# Patient Record
Sex: FEMALE | Race: AMERICAN INDIAN OR ALASKA NATIVE
[De-identification: names, ages, dates, MRNs, and addresses within clinical notes are randomized per-mention and may not be internally consistent; named-entity substitution may affect disease eponyms.]

---

## 2018-01-02 ENCOUNTER — HOSPITAL ENCOUNTER (OUTPATIENT)
Dept: HOSPITAL 31 - C.ENDO | Age: 53
Discharge: HOME | End: 2018-01-02
Attending: SPECIALIST
Payer: COMMERCIAL

## 2018-01-02 VITALS — HEART RATE: 55 BPM | DIASTOLIC BLOOD PRESSURE: 66 MMHG | SYSTOLIC BLOOD PRESSURE: 122 MMHG

## 2018-01-02 VITALS — OXYGEN SATURATION: 100 %

## 2018-01-02 VITALS — TEMPERATURE: 97.7 F

## 2018-01-02 VITALS — RESPIRATION RATE: 12 BRPM

## 2018-01-02 DIAGNOSIS — Z86.010: ICD-10-CM

## 2018-01-02 DIAGNOSIS — K52.9: ICD-10-CM

## 2018-01-02 DIAGNOSIS — K64.8: ICD-10-CM

## 2018-01-02 DIAGNOSIS — I25.10: ICD-10-CM

## 2018-01-02 DIAGNOSIS — Z88.0: ICD-10-CM

## 2018-01-02 DIAGNOSIS — Z86.73: ICD-10-CM

## 2018-01-02 DIAGNOSIS — K57.30: Primary | ICD-10-CM

## 2018-01-02 DIAGNOSIS — I10: ICD-10-CM

## 2018-01-02 PROCEDURE — 88305 TISSUE EXAM BY PATHOLOGIST: CPT

## 2018-01-02 PROCEDURE — 45380 COLONOSCOPY AND BIOPSY: CPT

## 2018-01-02 NOTE — CP.SDSHP
Same Day Surgery [facility] & P





- History


Proposed Procedure: COLONSCOPY


Pre-Op Diagnosis: SEE NOTES





- Previous Medical/Surgical History


Cardiac: Hypertension, ASHD/CAD


Endocrine/Metabolic: Other


Neuro: TIA/CVA, Backaches


Previous Surgical History: COLON POLYP REMOVAL





- Allergies


Allergies: 


Allergies





Penicillins Allergy (Intermediate, Verified 01/02/18 07:01)


 RASH











- Physical Exam


Vital Signs: 


 Vital Signs











  01/02/18





  07:32


 


Temperature 97 F L


 


Pulse Rate 61


 


Respiratory 19





Rate 


 


Blood Pressure 128/83


 


O2 Sat by Pulse 100





Oximetry 











Mental Status: Alert & Oriented x3


Neuro: WNL


Heart: Other


Lungs: WNL


GI: Other





- {Optional Preform as Required}


Breast: WNL


Abdomen: Other


Rectal: Other


Integument: WNL


: WNL


Ortho: Other


ENT: WNL





- Impression


Pt. Evaluated Today:Candidate for Anesthesia & Procedure: Yes





- Date & Time


Time: 08:27





Short Stay Discharge





- Short Stay Discharge


Admitting Diagnosis/Reason for Visit: P/H/ COLON POLYPS


Disposition: HOME/ ROUTINE

## 2018-08-08 ENCOUNTER — HOSPITAL ENCOUNTER (INPATIENT)
Dept: HOSPITAL 42 - ED | Age: 53
LOS: 5 days | Discharge: HOME | DRG: 880 | End: 2018-08-13
Attending: INTERNAL MEDICINE | Admitting: INTERNAL MEDICINE
Payer: COMMERCIAL

## 2018-08-08 VITALS — BODY MASS INDEX: 41.8 KG/M2

## 2018-08-08 DIAGNOSIS — I69.351: ICD-10-CM

## 2018-08-08 DIAGNOSIS — G47.33: ICD-10-CM

## 2018-08-08 DIAGNOSIS — Z79.82: ICD-10-CM

## 2018-08-08 DIAGNOSIS — M21.371: ICD-10-CM

## 2018-08-08 DIAGNOSIS — R47.1: ICD-10-CM

## 2018-08-08 DIAGNOSIS — E78.5: ICD-10-CM

## 2018-08-08 DIAGNOSIS — G40.909: ICD-10-CM

## 2018-08-08 DIAGNOSIS — I63.9: Primary | ICD-10-CM

## 2018-08-08 DIAGNOSIS — I10: ICD-10-CM

## 2018-08-08 DIAGNOSIS — R29.810: ICD-10-CM

## 2018-08-08 DIAGNOSIS — E11.69: ICD-10-CM

## 2018-08-08 DIAGNOSIS — R40.2412: ICD-10-CM

## 2018-08-08 DIAGNOSIS — E66.01: ICD-10-CM

## 2018-08-08 DIAGNOSIS — J44.9: ICD-10-CM

## 2018-08-08 DIAGNOSIS — E87.6: ICD-10-CM

## 2018-08-08 LAB
ALBUMIN SERPL-MCNC: 4.6 G/DL (ref 3–4.8)
ALBUMIN/GLOB SERPL: 1.3 {RATIO} (ref 1.1–1.8)
ALT SERPL-CCNC: 31 U/L (ref 7–56)
APTT BLD: 32.5 SECONDS (ref 25.1–36.5)
AST SERPL-CCNC: 29 U/L (ref 14–36)
BASOPHILS # BLD AUTO: 0.04 K/MM3 (ref 0–2)
BASOPHILS NFR BLD: 0.5 % (ref 0–3)
BUN SERPL-MCNC: 13 MG/DL (ref 7–21)
CALCIUM SERPL-MCNC: 9.3 MG/DL (ref 8.4–10.5)
EOSINOPHIL # BLD: 0.2 10*3/UL (ref 0–0.7)
EOSINOPHIL NFR BLD: 2.1 % (ref 1.5–5)
ERYTHROCYTE [DISTWIDTH] IN BLOOD BY AUTOMATED COUNT: 15.6 % (ref 11.5–14.5)
GFR NON-AFRICAN AMERICAN: > 60
GRANULOCYTES # BLD: 4.12 10*3/UL (ref 1.4–6.5)
GRANULOCYTES NFR BLD: 53.7 % (ref 50–68)
HDLC SERPL-MCNC: 55 MG/DL (ref 29–60)
HGB BLD-MCNC: 12.7 G/DL (ref 12–16)
INR PPP: 1
LDLC SERPL-MCNC: 51 MG/DL (ref 0–129)
LYMPHOCYTES # BLD: 2.7 10*3/UL (ref 1.2–3.4)
LYMPHOCYTES NFR BLD AUTO: 35.4 % (ref 22–35)
MCH RBC QN AUTO: 25 PG (ref 25–35)
MCHC RBC AUTO-ENTMCNC: 32.3 G/DL (ref 31–37)
MCV RBC AUTO: 77.2 FL (ref 80–105)
MONOCYTES # BLD AUTO: 0.6 10*3/UL (ref 0.1–0.6)
MONOCYTES NFR BLD: 8.3 % (ref 1–6)
PLATELET # BLD: 367 10^3/UL (ref 120–450)
PMV BLD AUTO: 9.9 FL (ref 7–11)
PROTHROMBIN TIME: 11.4 SECONDS (ref 9.4–12.5)
RBC # BLD AUTO: 5.09 10^6/UL (ref 3.5–6.1)
TROPONIN I SERPL-MCNC: 0.01 NG/ML
WBC # BLD AUTO: 7.7 10^3/UL (ref 4.5–11)

## 2018-08-08 RX ADMIN — LEVETIRACETAM SCH MLS/HR: 5 INJECTION INTRAVENOUS at 21:20

## 2018-08-08 NOTE — CP.PCM.HP
<Portillo An - Last Filed: 08/08/18 21:27>





History of Present Illness





- History of Present Illness


History of Present Illness: 


CC: Right Sided Weakness/?CVA





HPI: Ms. Balderas is a 52 year old female with a past medical history 

significant for previous CVA with residual RLE weakness, seizures, DM2, HTN, TRISTIN

, asthma, and vertigo who presented with right sided weakness, dysarthria and 

facial drooping that started at approximately 1430. Patient reports that she 

was in Dr. Reed's office (Neurology) having an EEG when her entire body began 

"shaking". This lasted for a "few minutes" and afterwards patient was noted to 

have facial drooping on the right side and right lower extremity weakness 

worsened from her baseline. Patient denies any LOC or loss of bowl or bladder 

control during the event but endorses tongue biting. She denies any other 

symptoms including fever, chills, headache, changes in her vision, dysphagia, 

neck pain/stiffness, chest pain, palpitations, SOB, cough, wheezing, abdominal 

pain, N/V/D/C, melena, changes in urine output, skin changes, or any numbness/

tingling of any extremity.





PMH: As stated above


PSH: As stated above


Family History: Non-Contributory


Social History: Denies any tobacco, alcohol or illicit drug use


Allergies: Penicillin


Home Medications: As per MAR





Present on Admission





- Present on Admission


Any Indicators Present on Admission: No





Review of Systems





- Review of Systems


Review of Systems: 


As stated in the HPI, otherwise negative





Past Patient History





- Infectious Disease


Hx of Infectious Diseases: None





- Past Social History


Smoking Status: Unknown If Ever Smoked





- NEUROLOGICAL


HX Cerebrovascular Accident: Yes


Hx Seizures: Yes





- PSYCHIATRIC


Hx Substance Use: No





- SURGICAL HISTORY


Hx Surgeries: No





Meds


Allergies/Adverse Reactions: 


 Allergies











Allergy/AdvReac Type Severity Reaction Status Date / Time


 


Penicillins Allergy Intermediate RASH Verified 01/02/18 07:01














Physical Exam





- Constitutional


Appears: Non-toxic, No Acute Distress





- Head Exam


Head Exam: ATRAUMATIC, NORMOCEPHALIC


Additional comments: 


Right sided facial droop





- Eye Exam


Eye Exam: EOMI, Normal appearance, PERRL.  absent: Conjunctival injection, 

Nystagmus, Periorbital swelling, Periorbital tenderness, Scleral icterus


Pupil Exam: NORMAL ACCOMODATION, PERRL.  absent: Fixed, Irregular, Miosis, 

Mydriatic, Unequal





- ENT Exam


ENT Exam: Mucous Membranes Moist, Normal External Ear Exam (Bilateral 

laceration on tongue consistent with ).  absent: Mucous Membranes Dry, Normal 

Oropharynx





- Neck Exam


Neck exam: Positive for: Full Rom, Normal Inspection.  Negative for: 

Lymphadenopathy, Meningismus, Tenderness, Thyromegaly





- Respiratory Exam


Respiratory Exam: Clear to Auscultation Bilateral, NORMAL BREATHING PATTERN.  

absent: Accessory Muscle Use, Chest Wall Tenderness, Decreased Breath Sounds, 

Prolonged Expiratory Phase, Rales, Rhonchi, Wheezes, Respiratory Distress, 

Stridor





- Cardiovascular Exam


Cardiovascular Exam: REGULAR RHYTHM, RRR, +S1, +S2.  absent: Bradycardia, 

Tachycardia, Clicks, Diastolic murmur, Gallop, Irregular Rhythm, JVD, Rubs, +S4

, Systolic Murmur





- GI/Abdominal Exam


GI & Abdominal Exam: Normal Bowel Sounds, Soft.  absent: Bruit, Diminished 

Bowel Sounds, Distended, Firm, Guarding, Hernia, Hyperactive Bowel Sounds, 

Hypoactive Bowel Sounds, Mass, Organomegaly, Pulsatile Mass, Rebound, Rigid, 

Tenderness





- Extremities Exam


Extremities exam: Positive for: normal capillary refill, pedal pulses present.  

Negative for: calf tenderness, full ROM (Decreased ROM in RLE), joint swelling, 

normal inspection, pedal edema, tenderness





- Back Exam


Back exam: FULL ROM, NORMAL INSPECTION.  absent: CVA tenderness (L), CVA 

tenderness (R), muscle spasm, paraspinal tenderness, rash noted, tenderness, 

vertebral tenderness





- Neurological Exam


Neurological exam: Alert, Oriented x3





- Expanded Neurological Exam


  ** Expanded


Patient oriented to: person, place, time


Speech: Slurred Speech


Cranial nerves: Facial Palsey w/Forehead Movement: Abnormal Right


Neuro motor strength exam: Left Upper Extremity: 4, Right Upper Extremity: 4, 

Left Lower Extremity: 4, Right Lower Extremity: 2/1





- Psychiatric Exam


Psychiatric exam: Normal Affect, Normal Mood





- Skin


Skin Exam: Dry, Intact, Normal Color, Warm





Results





- Vital Signs


Recent Vital Signs: 





 Last Vital Signs











Temp  99 F   08/08/18 17:15


 


Pulse  77   08/08/18 17:15


 


Resp  20   08/08/18 17:15


 


BP  132/89   08/08/18 17:15


 


Pulse Ox      














- Labs


Result Diagrams: 


 08/08/18 14:30





 08/08/18 14:30


Labs: 





 Laboratory Results - last 24 hr











  08/08/18 08/08/18 08/08/18





  14:30 14:30 14:30


 


WBC  7.7  


 


RBC  5.09  


 


Hgb  12.7  


 


Hct  39.3  


 


MCV  77.2 L  


 


MCH  25.0  


 


MCHC  32.3  


 


RDW  15.6 H  


 


Plt Count  367  


 


MPV  9.9  


 


Gran %  53.7  


 


Lymph % (Auto)  35.4 H  


 


Mono % (Auto)  8.3 H  


 


Eos % (Auto)  2.1  


 


Baso % (Auto)  0.5  


 


Gran #  4.12  


 


Lymph # (Auto)  2.7  


 


Mono # (Auto)  0.6  


 


Eos # (Auto)  0.2  


 


Baso # (Auto)  0.04  


 


PT   11.4 


 


INR   1.00 


 


APTT   32.5 


 


Sodium    145


 


Potassium    4.2


 


Chloride    106


 


Carbon Dioxide    26


 


Anion Gap    18


 


BUN    13


 


Creatinine    0.9


 


Est GFR ( Amer)    > 60


 


Est GFR (Non-Af Amer)    > 60


 


Random Glucose    84


 


Calcium    9.3


 


Total Bilirubin    0.4


 


AST    29


 


ALT    31


 


Alkaline Phosphatase    99


 


Troponin I    0.01


 


Total Protein    8.0


 


Albumin    4.6


 


Globulin    3.5


 


Albumin/Globulin Ratio    1.3


 


Triglycerides    102


 


Cholesterol    130


 


LDL Cholesterol Direct    51


 


HDL Cholesterol    55


 


Blood Type   


 


Antibody Screen   


 


BBK History Checked   














  08/08/18





  14:30


 


WBC 


 


RBC 


 


Hgb 


 


Hct 


 


MCV 


 


MCH 


 


MCHC 


 


RDW 


 


Plt Count 


 


MPV 


 


Gran % 


 


Lymph % (Auto) 


 


Mono % (Auto) 


 


Eos % (Auto) 


 


Baso % (Auto) 


 


Gran # 


 


Lymph # (Auto) 


 


Mono # (Auto) 


 


Eos # (Auto) 


 


Baso # (Auto) 


 


PT 


 


INR 


 


APTT 


 


Sodium 


 


Potassium 


 


Chloride 


 


Carbon Dioxide 


 


Anion Gap 


 


BUN 


 


Creatinine 


 


Est GFR ( Amer) 


 


Est GFR (Non-Af Amer) 


 


Random Glucose 


 


Calcium 


 


Total Bilirubin 


 


AST 


 


ALT 


 


Alkaline Phosphatase 


 


Troponin I 


 


Total Protein 


 


Albumin 


 


Globulin 


 


Albumin/Globulin Ratio 


 


Triglycerides 


 


Cholesterol 


 


LDL Cholesterol Direct 


 


HDL Cholesterol 


 


Blood Type  O POSITIVE


 


Antibody Screen  Negative


 


BBK History Checked  No verified bt














Assessment & Plan





- Assessment and Plan (Free Text)


Assessment: 


52 year old female with a past medical history significant for previous CVA 

with residual RLE weakness, seizures, DM2, HTN, TRISTIN, asthma, and vertigo who 

presented with right sided weakness, dysarthria and facial drooping that 

started at approximately 1430.


Plan: 


1. Dysarthria/?CVA


-CT Head showed no intracranial abnormalities


-Initial NIHSS score in ED: 3


-Given tPA in ED at approximately 1600


-CTA Head/Neck, Brain MRI and Echo pending


-Swallow Study pending


-NPO Diet


-Fall and Aspiration precautions


-ICU placement


-Neurology and Cardiology consulted, all recommendations appreciated





2. History of Seizures


-Keppra 500mg IV Q12


-Seizure precautions





3. History of DM2


-SSI-Low and Accuchecks Q6


-A1c pending





4. History of Asthma


-Duonebs Q2 PRN





GI Prophylaxis: Protonix


DVT Prophylaxis: SCD's 





Patient seen and case discussed with attending, Dr. Cummings.





[place] PGY2





- Date & Time


Date: 08/08/18


Time: 21:38





<Shannon Cummings - Last Filed: 08/09/18 08:44>





Results





- Vital Signs


Recent Vital Signs: 





 Last Vital Signs











Temp  98.4 F   08/09/18 07:47


 


Pulse  69   08/09/18 07:47


 


Resp  16   08/09/18 07:47


 


BP  112/67   08/09/18 07:47


 


Pulse Ox  94 L  08/09/18 06:45














- Labs


Result Diagrams: 


 08/08/18 14:30





 08/08/18 14:30


Labs: 





 Laboratory Results - last 24 hr











  08/08/18





  17:00


 


TSH 3rd Generation  5.38 H














Attending/Attestation





- Attestation


I have personally seen and examined this patient.: Yes


I have fully participated in the care of the patient.: Yes


I have reviewed all pertinent clinical information: Yes


Notes (Text): 





08/09/18 08:40





Medical record note made by the resident after discussion with my direction and 

input after the patient was personally seen and examined by me. I have reviewed 

the chart and agree that the record accurately reflects by personal performance 

of the history, physical exam, data review, and medical decision-making, in the 

course for the patient. I have also personally directed the plan of care.


52 year old female with a past medical history significant for previous CVA 

with residual RLE weakness, seizures, DM2, HTN, TRISTIN, asthma, and vertigo who 

presented with right sided weakness, dysarthria and facial drooping  , patient 

was in her Neurologist office at that time.CT head was negative.Patient is 

getting TPA in ER.We will monitor Neuro check, will kepp patient NPO, we will 

get MRI of Brain, CTA of head and neck and Echo.We will hold antiplatelet  for 

24 hour.


We will get speech and swallow evaluation.We will monitor blood pressure and 

blood sugars closely.





Management plan was discussed in detail with patient. Education was provided.

## 2018-08-08 NOTE — CP.PCM.CON
History of Present Illness





- History of Present Illness


History of Present Illness: 


     52 yr old woman with pmh of epilepsy, on keppra 500 mg hitesh is a patient 

of dr johnsonoors comes in [facility] with one hour onset of dysarthria and right sided 

weakness that started about one hour ago at 2:30 pm. Miss de dios was in dr toyin scott and was obtaining an eeg when she developedan acute onset of 

dysarthria and weakness of her rogh side. Upon arrival bto  the ER, symptoms 

continued.  








PMH


PSH


FH/SH


ALL:





Past Patient History





- Infectious Disease


Hx of Infectious Diseases: None





- Past Social History


Smoking Status: Unknown If Ever Smoked





- NEUROLOGICAL


HX Cerebrovascular Accident: Yes


Hx Seizures: Yes





- PSYCHIATRIC


Hx Substance Use: No





- SURGICAL HISTORY


Hx Surgeries: No





Meds


Allergies/Adverse Reactions: 


 Allergies











Allergy/AdvReac Type Severity Reaction Status Date / Time


 


No Known Allergies Allergy   Verified 08/08/18 15:28














- Medications


Medications: 


 Current Medications





Sodium Chloride (Sodium Chloride 0.9%)  1,000 mls @ 100 mls/hr IV .Q10H TYLER

## 2018-08-08 NOTE — EDPD
HPI Stroke





- General


Time Seen by Provider: 08/08/18 15:20


Historian: Patient, EMS





- History of Present Illness


Narrative History of Present Illness (Free Text): 


08/08/18 15:28


Patient is a 52 year old female whose past medical history includes CVA, who 

presents to the emergency department by EMS for possible seizure/stroke. EMS 

reports that the patient was last well at 14:30 today and was getting an EEG. 

At 15:00 she started visibly shaking, slurred speech, and right sided weakness. 

Patient reports that she subsequently couldn't walk. SHe normally walks with a 

cane.





Neurologist: Dr.Vinod Reed.





Date:: 08/08/18


Onset:: Just prior to presenting





Family/Social History





- Family/Social History


Family History: Non-Contributory





Allergies/Home Meds


Allergies/Adverse Reactions: 


Allergies





No Known Allergies Allergy (Verified 08/08/18 15:28)


 











Medical Decision Making


ED Course and Treatment: 








Impression:








Differential Diagnosis included but are not limited to:  





Plan:





-- Reassess and disposition





Prior Visits:


Notes and results from previous visits were reviewed.  Patient was last seen in 

the Emergency department on ***.





Progress Notes:


 





08/08/18 15:31








- RAD Interpretation


Radiology Orders: 








08/08/18 15:21


CTA HEAD/NECK CODE STROKE [CT] Stat 


HEAD W/O (CODE STROKE) [CT] Stat 


CHEST PORTABLE [RAD] Stat 














- Medication Orders


Current Medication Orders: 








Sodium Chloride (Sodium Chloride 0.9%)  1,000 mls @ 100 mls/hr IV .Q10H TYLER

## 2018-08-08 NOTE — ED PDOC
Arrival/HPI





- General


Chief Complaint: Weakness/Neurological Deficit


Time Seen by Provider: 08/08/18 15:20


Historian: Patient, EMS


EM Caveat: Acuity of Condition





- Critical Care


Critical Care Minutes: 30 minutes


Critical Care Time: Excluding Proc Time





- History of Present Illness


Narrative History of Present Illness (Text): 





08/08/18 15:36


Patient is a 52 year old female whose past medical history includes CVA, who 

presents to the Emergency department by EMS for possible seizure/stroke. As per 

EMS patient was last well at 14:30 and was getting an EEG. At 15:00 she started 

visibly shaking and developing slurred speech and right sided weakness. Patient 

reports that she subsequently couldn't walk. She normally walks with a cane. 

Futher hx limited by slurred speech.





Time/Duration: Prior to Arrival


Symptom Onset: Sudden





Past Medical History





- Provider Review


Nursing Documentation Reviewed: Yes





- Infectious Disease


Hx of Infectious Diseases: None





- Neurological


HX Cerebrovascular Accident: Yes


Hx Seizures: Yes





- Psychiatric


Hx Substance Use: No





Family/Social History





- Physician Review


Nursing Documentation Reviewed: Yes


Family/Social History: No Known Family HX


Smoking Status: Unknown If Ever Smoked


Hx Alcohol Use: No


Hx Substance Use: No





Allergies/Home Meds


Allergies/Adverse Reactions: 


Allergies





No Known Allergies Allergy (Verified 08/08/18 16:03)


 











Review of Systems





- Review of Systems


Systems not reviewed;Unavailable: Acuity of Condition


Neurological: Speech Changes, Other (right sided weakness)





Physical Exam


Vital Signs Reviewed: Yes


Vital Signs











  Temp Pulse Resp BP


 


 08/08/18 17:00  98.8 F  82  22  133/90


 


 08/08/18 16:46  99 F  79  22  154/75 H


 


 08/08/18 16:30  99.4 F  84  20  157/80 H











Temperature: Afebrile


Blood Pressure: Hypertensive


Pulse: Regular


Respiratory Rate: Normal


Appearance: Positive for: Well-Appearing


Mental Status: Positive for: Alert and Oriented X 3





- Systems Exam


Head: Present: Atraumatic, Normocephalic


Pupils: Present: PERRL


Extroacular Muscles: Present: EOMI


Conjunctiva: Present: Normal


Mouth: Present: Moist Mucous Membranes


Neck: Present: Normal Range of Motion


Respiratory/Chest: Present: Clear to Auscultation, Good Air Exchange.  No: 

Respiratory Distress, Accessory Muscle Use


Cardiovascular: Present: Regular Rate and Rhythm, Normal S1, S2.  No: Murmurs


Abdomen: No: Tenderness, Distention, Peritoneal Signs


Back: Present: Normal Inspection


Upper Extremity: Present: Normal Inspection, Normal ROM.  No: Cyanosis, Edema


Lower Extremity: Present: Other (Normal strength to left lower extremity. RLE: 

muscle twitch visible but cannot lift leg off stretcher.).  No: Edema, Normal 

ROM


Neurological: Present: GCS=15, Other (Left facial droop, slurred speech)


Skin: Present: Warm, Dry, Normal Color.  No: Rashes


Psychiatric: Present: Alert, Oriented x 3, Normal Insight, Normal Concentration





Medical Decision Making


ED Course and Treatment: 





08/08/18 15:27


Impression:


Patient is a 52 year old female who was brought by EMS for stroke evaluation.





Differential Diagnosis included but are not limited to:  





Plan:


--CTA and Head CT


--EKG


-- Labs


--cardiac enzymes


--blood work


--chest x-ray


--IV fluids


--Aspirin


-- Reassess and disposition





Progress Notes:


08/08/18 15:15 Patient immediately evalauted and code Stroke called.





08/08/18 15:18


Discussed case with , who is aware of patient and states that if it is a 

seizure then she isn't a TPA candidate. Requesting CT and CTA.





08/08/18 15:33


 called and reports that patient was hooked up on EEG when she started 

developing symptoms of slurred speech and leg weakness and EEG findings don't 

show seizure.  





08/08/18 16:16


Spoke to Dr. Wall and due to aphasia and weakness, decision made to give TPA.  

Weakness to R leg improving but due to aphasia and droop, decision still made 

to give TPA





08/08/18 16:28


Head CT without contrast:


Creator : Clark Sheikh MD


HISTORY:


Code Stroke


COMPARISON:


None available.


TECHNIQUE:


Axial computed tomography images were obtained through the head/brain without 

intravenous contrast.  


Radiation dose:


Total exam DLP = 870 mGy-cm.


This CT exam was performed using one or more of the following dose reduction 

techniques: Automated exposure control, adjustment of the mA and/or kV 

according to patient size, and/or use of iterative reconstruction technique.





FINDINGS:


HEMORRHAGE:


No intracranial hemorrhage. 


BRAIN:


No mass effect or edema.  No atrophy or chronic microvascular ischemic changes.


VENTRICLES:


Unremarkable. No hydrocephalus. 


CALVARIUM:


Unremarkable.


PARANASAL SINUSES:


Unremarkable as visualized. No significant inflammatory changes


MASTOID AIR CELLS:


Unremarkable as visualized. No inflammatory changes.


OTHER FINDINGS:


None.


IMPRESSION:


No acute findings





Chest X-ray:


Creator : Jagdeep Rojas MD


HISTORY:


Code Stroke


COMPARISON:


None available.


FINDINGS:


LUNGS:


Clear.


PLEURA:No pneumothorax or pleural fluid seen.


CARDIOVASCULAR:Normal.


OSSEOUS STRUCTURES: No significant abnormalities.


VISUALIZED UPPER ABDOMEN: Normal.


OTHER FINDINGS: None. 


IMPRESSION:


No active disease.





08/08/18 


CTA unable to be preformed due to IV access. Decision made with neurologist to 

administer TPA when IV access is obtained instead of sending her back for CTA.





08/08/18 16:51


EKG shows NSR at 78 bpm with LVH and nonspecific ST changes. Interpreted by me.





08/08/18 16:51


Intensivist at bedside.





08/08/18 16:53


Patient accepted by hospitalist.








- Lab Interpretations


Lab Results: 








 08/08/18 14:30 





 08/08/18 14:30 





 Lab Results





08/08/18 14:30: Sodium 145, Potassium 4.2, Chloride 106, Carbon Dioxide 26, 

Anion Gap 18, BUN 13, Creatinine 0.9, Est GFR (African Amer) > 60, Est GFR (Non-

Af Amer) > 60, Random Glucose 84, Calcium 9.3, Total Bilirubin 0.4, AST 29, ALT 

31, Alkaline Phosphatase 99, Troponin I 0.01, Total Protein 8.0, Albumin 4.6, 

Globulin 3.5, Albumin/Globulin Ratio 1.3, Triglycerides 102, Cholesterol 130, 

LDL Cholesterol Direct 51, HDL Cholesterol 55


08/08/18 14:30: PT 11.4, INR 1.00, APTT 32.5


08/08/18 14:30: WBC 7.7, RBC 5.09, Hgb 12.7, Hct 39.3, MCV 77.2 L, MCH 25.0, 

MCHC 32.3, RDW 15.6 H, Plt Count 367, MPV 9.9, Gran % 53.7, Lymph % (Auto) 35.4 

H, Mono % (Auto) 8.3 H, Eos % (Auto) 2.1, Baso % (Auto) 0.5, Gran # 4.12, Lymph 

# (Auto) 2.7, Mono # (Auto) 0.6, Eos # (Auto) 0.2, Baso # (Auto) 0.04








I have reviewed the lab results: Yes





- RAD Interpretation


Radiology Orders: 








08/08/18 15:21


CTA HEAD/NECK CODE STROKE [CT] Stat 


HEAD W/O (CODE STROKE) [CT] Stat 


CHEST ONE VIEW [RAD] Stat 





08/08/18 17:02


MRI CODE STROKE/CODE [place] [MRI] Stat 











: Radiologist





- EKG Interpretation


Interpreted by ED Physician: Yes


Type: 12 lead EKG





- Medication Orders


Current Medication Orders: 








Sodium Chloride (Sodium Chloride 0.9%)  1,000 mls @ 100 mls/hr IV .Q10H TYLER


Alteplase, Recombinant (Activase 100 Mg Inj)  81 mls @ 81 mls/hr IV ONCE ONE


   Stop: 08/08/18 17:29


Pantoprazole Sodium (Protonix Ec Tab)  40 mg PO 0600 TYLER





Discontinued Medications





Alteplase, Recombinant (Activase 100 Mg Inj)  9 mg IV ONCE ONE


   Stop: 08/08/18 16:16


Aspirin (Aspirin Supp)  300 mg RC STAT STA


   Stop: 08/08/18 15:42


   Last Admin: 08/08/18 15:53  Dose: 300 mg





MAR Pain/Vitals


 Document     08/08/18 15:53  LA  (Rec: 08/08/18 15:53  LYLE [id number])


     Pain Reassessment


      Is This A Pain ReAssessment?               No


     Sleep


      Is patient sleeping during reassessment?   No


     Presence of Pain


      Presence of Pain                           No














NIHSS Scale ([place])


Time Performed: 15:47





- How Severe is the Stoke


  ** Baseline


Level of Consciousness: 0=Alert


LOC to Questions: 0=Both comments correct


LOC to commands: 0=Obeys both correctly


Best Gaze: 0=Normal


Visual: 0=No visual loss


Facial: 1=Minor asymmetry


Motor Arm - Left: 0=No drift


Motor Arm - Right: 0=No drift


Motor Leg - Left: 0=No drift


Motor Leg - Right: 3=No effort against gravity (falls immediately)


Limb Ataxia: 0=Absent


Sensory: 0=Normal


Best Language: 1=Mild to moderate aphasia


Dysarthia: 1=Mild to moderate slurring


Extinction & Inattention (Neglect): 0=Normal, no object


Score: 6


Risk Level: Mod Stroke Risk





NIHSS Scale([place]) 2


Time Performed: 17:14





- How Severe is the Stoke


  ** Baseline


Level of Consciousness: 0=Alert


LOC to Questions: 0=Both comments correct


LOC to commands: 0=Obeys both correctly


Best Gaze: 0=Normal


Visual: 0=No visual loss


Facial: 1=Minor asymmetry


Motor Arm - Left: 0=No drift


Motor Arm - Right: 0=No drift


Motor Leg - Left: 0=No drift


Motor Leg - Right: 1=Drift before 5 sec


Limb Ataxia: 0=Absent


Sensory: 0=Normal


Best Language: 1=Mild to moderate aphasia


Dysarthia: 1=Mild to moderate slurring


Extinction & Inattention (Neglect): 0=Normal, no object


Score: 4


Risk Level: Minor Stroke Risk





- Scribe Statement


The provider has reviewed the documentation as recorded by the Scribroseanne Trinh


Provider Scribe Attestation:


All medical record entries made by the Scribe were at my direction and 

personally dictated by me. I have reviewed the chart and agree that the record 

accurately reflects my personal performance of the history, physical exam, 

medical decision making, and the department course for this patient. I have 

also personally directed, reviewed, and agree with the discharge instructions 

and disposition. 





Disposition/Present on Arrival





- Present on Arrival


Any Indicators Present on Arrival: No


History of DVT/PE: No


History of Uncontrolled Diabetes: No


Urinary Catheter: No


History of Decub. Ulcer: No


History Surgical Site Infection Following: None





- Disposition


Have Diagnosis and Disposition been Completed?: Yes


Diagnosis: 


 CVA (cerebral vascular accident)





Disposition: HOSPITALIZED


Disposition Time: 16:30


Patient Plan: Admission, ICU


Patient Problems: 


 Current Active Problems











Problem Status Onset


 


CVA (cerebral vascular accident) Acute  











Condition: FAIR


Forms:  [employer] (English)

## 2018-08-08 NOTE — RAD
Date of service: 



08/08/2018



PROCEDURE:  CHEST RADIOGRAPH, 1 VIEW



HISTORY:

Code Stroke



COMPARISON:

None available.



FINDINGS:



LUNGS:

Clear.



PLEURA:

No pneumothorax or pleural fluid seen.



CARDIOVASCULAR:

Normal.



OSSEOUS STRUCTURES:

No significant abnormalities.



VISUALIZED UPPER ABDOMEN:

Normal.



OTHER FINDINGS:

None. 



IMPRESSION:

No active disease.

## 2018-08-08 NOTE — CT
Date of service: 



08/08/2018



PROCEDURE:  CT HEAD WITHOUT CONTRAST.



HISTORY:

Code Stroke



COMPARISON:

None available.



TECHNIQUE:

Axial computed tomography images were obtained through the head/brain 

without intravenous contrast.  



Radiation dose:



Total exam DLP = 870 mGy-cm.



This CT exam was performed using one or more of the following dose 

reduction techniques: Automated exposure control, adjustment of the 

mA and/or kV according to patient size, and/or use of iterative 

reconstruction technique.



FINDINGS:



HEMORRHAGE:

No intracranial hemorrhage. 



BRAIN:

No mass effect or edema.  No atrophy or chronic microvascular 

ischemic changes.



VENTRICLES:

Unremarkable. No hydrocephalus. 



CALVARIUM:

Unremarkable.



PARANASAL SINUSES:

Unremarkable as visualized. No significant inflammatory changes.



MASTOID AIR CELLS:

Unremarkable as visualized. No inflammatory changes.



OTHER FINDINGS:

None.



IMPRESSION:

No acute findings

## 2018-08-08 NOTE — CP.PCM.CON
History of Present Illness





- History of Present Illness


History of Present Illness: 


MICU Consult Note








HPI


Patient is 51yo female with PMHx of morbid obesity, TRISTIN, CVA/TIA, HTN, 

presented to the ER with acute onset of slurred speech, dysarthria, facial 

droop and RLE weakness, that started at 1430 at Dr Welch office while 

obtaining an EEG. Pt currently receiving tPA. Pt denies fever, chills, cough, 

chest pain, palpitations, HA, dizziness, NO other constitutional symptoms. 


Previous history of CVA/TIA in the past without any residual weakness/deficits. 








PMHx as above


PSHx as above


Meds as per EMR


FHx NC


Social denies smoking, EtOH, drug use, unemployed. 





Review of Systems





- Review of Systems


Review of Systems: 


As per HPI





Past Patient History





- Infectious Disease


Hx of Infectious Diseases: None





- Past Social History


Smoking Status: Unknown If Ever Smoked





- NEUROLOGICAL


HX Cerebrovascular Accident: Yes


Hx Seizures: Yes





- PSYCHIATRIC


Hx Substance Use: No





- SURGICAL HISTORY


Hx Surgeries: No





Meds


Allergies/Adverse Reactions: 


 Allergies











Allergy/AdvReac Type Severity Reaction Status Date / Time


 


No Known Allergies Allergy   Verified 08/08/18 16:03














- Medications


Medications: 


 Current Medications





Albuterol/Ipratropium (Duoneb 3 Mg/0.5 Mg (3 Ml) Ud)  3 ml IH Q2H PRN


   PRN Reason: Shortness of Breath


Sodium Chloride (Sodium Chloride 0.9%)  1,000 mls @ 100 mls/hr IV .Q10H [facility]


Alteplase, Recombinant (Activase 100 Mg Inj)  81 mls @ 81 mls/hr IV ONCE ONE


   Stop: 08/08/18 17:29


Levetiracetam (Keppra 500mg Ivpb)  500 mg in 100 mls @ 400 mls/hr IVPB Q12 [facility]


Insulin Human Regular (Humulin R Low)  0 units SC ACHS [facility]


   PRN Reason: Protocol


Pantoprazole Sodium (Protonix Inj)  40 mg IVP DAILY [facility]


Vitamin A (Vitamin A & D Oint Ud Foilpak)  1 ea TOP Q2 PRN


   PRN Reason: Dry mouth











Physical Exam





- Constitutional


Appears: Non-toxic, No Acute Distress





- Head Exam


Head Exam: NORMAL INSPECTION





- Eye Exam


Eye Exam: Normal appearance





- ENT Exam


ENT Exam: Mucous Membranes Moist





- Respiratory Exam


Respiratory Exam: Clear to Auscultation Bilateral, NORMAL BREATHING PATTERN





- Cardiovascular Exam


Cardiovascular Exam: REGULAR RHYTHM, +S1, +S2





- GI/Abdominal Exam


GI & Abdominal Exam: Normal Bowel Sounds, Soft





- Extremities Exam


Extremities exam: Positive for: normal inspection





- Neurological Exam


Neurological exam: Alert, Oriented x3


Additional comments: 





Motor RUE/RLE 5/5, LLE 4/5, RLE 3/5


Sensory intact





Results





- Vital Signs


Recent Vital Signs: 


 Last Vital Signs











Temp  99 F   08/08/18 17:15


 


Pulse  77   08/08/18 17:15


 


Resp  20   08/08/18 17:15


 


BP  132/89   08/08/18 17:15


 


Pulse Ox      














- Labs


Result Diagrams: 


 08/08/18 14:30





 08/08/18 14:30


Labs: 


 Laboratory Results - last 24 hr











  08/08/18 08/08/18 08/08/18





  14:30 14:30 14:30


 


WBC  7.7  


 


RBC  5.09  


 


Hgb  12.7  


 


Hct  39.3  


 


MCV  77.2 L  


 


MCH  25.0  


 


MCHC  32.3  


 


RDW  15.6 H  


 


Plt Count  367  


 


MPV  9.9  


 


Gran %  53.7  


 


Lymph % (Auto)  35.4 H  


 


Mono % (Auto)  8.3 H  


 


Eos % (Auto)  2.1  


 


Baso % (Auto)  0.5  


 


Gran #  4.12  


 


Lymph # (Auto)  2.7  


 


Mono # (Auto)  0.6  


 


Eos # (Auto)  0.2  


 


Baso # (Auto)  0.04  


 


PT   11.4 


 


INR   1.00 


 


APTT   32.5 


 


Sodium    145


 


Potassium    4.2


 


Chloride    106


 


Carbon Dioxide    26


 


Anion Gap    18


 


BUN    13


 


Creatinine    0.9


 


Est GFR ( Amer)    > 60


 


Est GFR (Non-Af Amer)    > 60


 


Random Glucose    84


 


Calcium    9.3


 


Total Bilirubin    0.4


 


AST    29


 


ALT    31


 


Alkaline Phosphatase    99


 


Troponin I    0.01


 


Total Protein    8.0


 


Albumin    4.6


 


Globulin    3.5


 


Albumin/Globulin Ratio    1.3


 


Triglycerides    102


 


Cholesterol    130


 


LDL Cholesterol Direct    51


 


HDL Cholesterol    55


 


Blood Type   


 


Antibody Screen   


 


BBK History Checked   














  08/08/18





  14:30


 


WBC 


 


RBC 


 


Hgb 


 


Hct 


 


MCV 


 


MCH 


 


MCHC 


 


RDW 


 


Plt Count 


 


MPV 


 


Gran % 


 


Lymph % (Auto) 


 


Mono % (Auto) 


 


Eos % (Auto) 


 


Baso % (Auto) 


 


Gran # 


 


Lymph # (Auto) 


 


Mono # (Auto) 


 


Eos # (Auto) 


 


Baso # (Auto) 


 


PT 


 


INR 


 


APTT 


 


Sodium 


 


Potassium 


 


Chloride 


 


Carbon Dioxide 


 


Anion Gap 


 


BUN 


 


Creatinine 


 


Est GFR ( Amer) 


 


Est GFR (Non-Af Amer) 


 


Random Glucose 


 


Calcium 


 


Total Bilirubin 


 


AST 


 


ALT 


 


Alkaline Phosphatase 


 


Troponin I 


 


Total Protein 


 


Albumin 


 


Globulin 


 


Albumin/Globulin Ratio 


 


Triglycerides 


 


Cholesterol 


 


LDL Cholesterol Direct 


 


HDL Cholesterol 


 


Blood Type  O POSITIVE


 


Antibody Screen  Negative


 


BBK History Checked  No verified bt














Assessment & Plan





- Assessment and Plan (Free Text)


Assessment: 


51yo female a/w acute CVA, receiving tPA








Acute CVA receiving tPA


TRISTIN


HTN


Morbid Obesity


Hx of CVA/TIA





- awake, alert, in NAD, protecting airway





Recommend:


- supp o2 as needed, BIPAP at night


- duonebs PRN


- NO ID issues


- BP control


- NPO


- speech swallow eval


- ECHO


- Lipid Panel, TSH, HgbA1C


- neurology follow up


- MRI brain


- GI ppx


- DVT ppx


- Admit to MICU

## 2018-08-09 LAB
ALBUMIN SERPL-MCNC: 3.4 G/DL (ref 3–4.8)
ALBUMIN/GLOB SERPL: 1.3 {RATIO} (ref 1.1–1.8)
ALT SERPL-CCNC: 22 U/L (ref 7–56)
AST SERPL-CCNC: 21 U/L (ref 14–36)
BASOPHILS # BLD AUTO: 0.01 K/MM3 (ref 0–2)
BASOPHILS NFR BLD: 0.2 % (ref 0–3)
BUN SERPL-MCNC: 8 MG/DL (ref 7–21)
CALCIUM SERPL-MCNC: 8.5 MG/DL (ref 8.4–10.5)
EOSINOPHIL # BLD: 0.1 10*3/UL (ref 0–0.7)
EOSINOPHIL NFR BLD: 1.9 % (ref 1.5–5)
ERYTHROCYTE [DISTWIDTH] IN BLOOD BY AUTOMATED COUNT: 15.6 % (ref 11.5–14.5)
GFR NON-AFRICAN AMERICAN: > 60
GRANULOCYTES # BLD: 3.23 10*3/UL (ref 1.4–6.5)
GRANULOCYTES NFR BLD: 56.9 % (ref 50–68)
HDLC SERPL-MCNC: 39 MG/DL (ref 29–60)
HGB BLD-MCNC: 10.8 G/DL (ref 12–16)
LDLC SERPL-MCNC: 45 MG/DL (ref 0–129)
LYMPHOCYTES # BLD: 1.9 10*3/UL (ref 1.2–3.4)
LYMPHOCYTES NFR BLD AUTO: 32.9 % (ref 22–35)
MCH RBC QN AUTO: 25.1 PG (ref 25–35)
MCHC RBC AUTO-ENTMCNC: 32.5 G/DL (ref 31–37)
MCV RBC AUTO: 77.2 FL (ref 80–105)
MONOCYTES # BLD AUTO: 0.5 10*3/UL (ref 0.1–0.6)
MONOCYTES NFR BLD: 8.1 % (ref 1–6)
PLATELET # BLD: 244 10^3/UL (ref 120–450)
PMV BLD AUTO: 9.9 FL (ref 7–11)
RBC # BLD AUTO: 4.3 10^6/UL (ref 3.5–6.1)
T4 FREE SERPL-MCNC: 0.99 NG/DL (ref 0.78–2.19)
WBC # BLD AUTO: 5.7 10^3/UL (ref 4.5–11)

## 2018-08-09 RX ADMIN — INSULIN HUMAN SCH: 100 INJECTION, SOLUTION PARENTERAL at 12:57

## 2018-08-09 RX ADMIN — INSULIN HUMAN SCH: 100 INJECTION, SOLUTION PARENTERAL at 00:00

## 2018-08-09 RX ADMIN — INSULIN HUMAN SCH: 100 INJECTION, SOLUTION PARENTERAL at 19:05

## 2018-08-09 RX ADMIN — LEVETIRACETAM SCH MLS/HR: 5 INJECTION INTRAVENOUS at 09:30

## 2018-08-09 RX ADMIN — LEVETIRACETAM SCH MLS/HR: 5 INJECTION INTRAVENOUS at 21:14

## 2018-08-09 NOTE — CP.PCM.PN
<Jerzy Bagley - Last Filed: 08/09/18 19:44>





Subjective





- Date & Time of Evaluation


Date of Evaluation: 08/09/18


Time of Evaluation: 09:00





- Subjective


Subjective: 


Jerzy Bagley PGY-1 Progress Note for Hospitalist Service





Patient seen and evaluated at bedside. Patient denies any acute events this 

morning. Patient admits to some headaches but denies chest pain, palpitations, 

shortness of breath, blurry vision, abdominal pain, confusion, and changes in 

urinary or bowel habits.








Objective





- Vital Signs/Intake and Output


Vital Signs (last 24 hours): 


 











Temp Pulse Resp BP Pulse Ox


 


 98.5 F   57 L  21   120/65   96 


 


 08/09/18 16:47  08/09/18 16:47  08/09/18 16:47  08/09/18 16:47  08/09/18 09:31








Intake and Output: 


 











 08/09/18 08/09/18





 06:59 18:59


 


Intake Total 1300 


 


Output Total 400 


 


Balance 900 














- Medications


Medications: 


 Current Medications





Acetaminophen (Tylenol 325mg Tab)  650 mg PO Q4H PRN


   PRN Reason: headache and pain


   Last Admin: 08/09/18 14:01 Dose:  650 mg


Albuterol/Ipratropium (Duoneb 3 Mg/0.5 Mg (3 Ml) Ud)  3 ml IH Q2H PRN


   PRN Reason: Shortness of Breath


Sodium Chloride (Sodium Chloride 0.9%)  1,000 mls @ 100 mls/hr IV .Q10H [facility]


   Last Admin: 08/09/18 16:55 Dose:  100 mls/hr


Levetiracetam (Keppra 500mg Ivpb)  500 mg in 100 mls @ 400 mls/hr IVPB Q12 [facility]


   Last Admin: 08/09/18 09:30 Dose:  400 mls/hr


Insulin Human Regular (Humulin R Low)  0 units SC Q6 [facility]


   PRN Reason: Protocol


   Last Admin: 08/09/18 12:57 Dose:  Not Given


Pantoprazole Sodium (Protonix Inj)  40 mg IVP DAILY [facility]


   Last Admin: 08/09/18 09:30 Dose:  40 mg


Vitamin A (Vitamin A & D Oint Ud Foilpak)  1 ea TOP Q2 PRN


   PRN Reason: Dry mouth











- Labs


Labs: 


 











PT  11.4 SECONDS (9.4-12.5)   08/08/18  14:30    


 


INR  1.00   08/08/18  14:30    


 


APTT  32.5 Seconds (25.1-36.5)   08/08/18  14:30    








Physical Exam





- Constitutional


Appears: Non-toxic, No Acute Distress





- Head Exam


Head Exam: ATRAUMATIC, NORMOCEPHALIC


Additional comments: 


Right sided facial droop





- Eye Exam


Eye Exam: EOMI, Normal appearance, PERRL.  absent: Conjunctival injection, 

Nystagmus, Periorbital swelling, Periorbital tenderness, Scleral icterus


Pupil Exam: NORMAL ACCOMODATION, PERRL.  absent: Fixed, Irregular, Miosis, 

Mydriatic, Unequal





- ENT Exam


ENT Exam: Mucous Membranes Moist, Normal External Ear Exam.  absent: Mucous 

Membranes Dry, Normal Oropharynx





- Neck Exam


Neck exam: Positive for: Full Rom, Normal Inspection.  Negative for: 

Lymphadenopathy, Meningismus, Tenderness, Thyromegaly





- Respiratory Exam


Respiratory Exam: Clear to Auscultation Bilateral, NORMAL BREATHING PATTERN.  

absent: Accessory Muscle Use, Chest Wall Tenderness, Decreased Breath Sounds, 

Prolonged Expiratory Phase, Rales, Rhonchi, Wheezes, Respiratory Distress, 

Stridor





- Cardiovascular Exam


Cardiovascular Exam: REGULAR RHYTHM, RRR, +S1, +S2.  absent: Bradycardia, 

Tachycardia, Clicks, Diastolic murmur, Gallop, Irregular Rhythm, JVD, Rubs, +S4

, Systolic Murmur





- GI/Abdominal Exam


GI & Abdominal Exam: Normal Bowel Sounds, Soft.  absent: Bruit, Diminished 

Bowel Sounds, Distended, Firm, Guarding, Hernia, Hyperactive Bowel Sounds, 

Hypoactive Bowel Sounds, Mass, Organomegaly, Pulsatile Mass, Rebound, Rigid, 

Tenderness





- Extremities Exam


Extremities exam: Decreased ROM in RLE, but could bring leg past midline. 

Diffiulty lifting.Positive for: normal capillary refill, pedal pulses present.  

Negative for: calf tenderness, joint swelling, normal inspection, pedal edema, 

tenderness





- Back Exam


Back exam: FULL ROM, NORMAL INSPECTION.  absent: CVA tenderness (L), CVA 

tenderness (R), muscle spasm, paraspinal tenderness, rash noted, tenderness, 

vertebral tenderness





- Neurological Exam


Neurological exam: Alert, Oriented x3





- Expanded Neurological Exam


  ** Expanded


Patient oriented to: person, place, time, situation


Speech: Slurred Speech, retained use of both sides of mouth


Cranial nerves: Facial Palsy w/Forehead Movement: Abnormal Right


Neuro motor strength exam: Left Upper Extremity: 4, Right Upper Extremity: 4, 

Left Lower Extremity: 4, Right Lower Extremity: 2


Sensation intact in UE and LE bilaterally





- Psychiatric Exam


Psychiatric exam: Normal Affect, Normal Mood





- Skin


Skin Exam: Dry, Intact, Normal Color, Warm








Assessment and Plan





- Assessment and Plan (Free Text)


Assessment: 


Assessment: 


52 year old female with a past medical history significant for previous CVA 

with residual RLE weakness, seizures, DM2, HTN, TRISTIN, asthma, and vertigo who 

presented with right sided weakness, dysarthria and facial drooping that 

started yesterday afternoon.





Plan: 


1. Dysarthria vs CVA


- Initial CT Head showed no intracranial abnormalities


- CXR showed no active disease


- Initial NIHSS score in ED: 3


- Given tPA in ED 8/8


- MRA Neck normal


- Brain MRI showed no acute abnormality. Mild chronic microangiopathic changes 

along with mild cerebellar tonsillar ectopia


- Repeat Head CT showed no evidence of acute infarct


- Swallow Study pending


- NPO Diet and meds on hold pending swallow and speech eval for advancement of 

diet


- NS @ 100 cc/hr


- Fall, bleed and Aspiration precautions


- ICU placement


- Neurology and Cardiology consulted, all recommendations appreciated


- f/u pending Echo w bubble study





2. History of Seizures


- Keppra 500mg IV Q12


- Seizure precautions





3. History of DM2


-SSI-Low and Accuchecks Q6


- A1c 5.8


- lipid panel wnl





4. History of Asthma


-Duonebs Q2 PRN





GI Prophylaxis: Protonix IVP


DVT Prophylaxis: SCD's 





Patient seen and case discussed with attending, Dr. Cummings.





[place] PGY2








<Shannon Cummings - Last Filed: 08/11/18 13:02>





Objective





- Vital Signs/Intake and Output


Vital Signs (last 24 hours): 


 











Temp Pulse Resp BP Pulse Ox


 


 98.3 F   58 L  21   126/72   96 


 


 08/10/18 20:00  08/11/18 10:15  08/11/18 08:29  08/11/18 10:15  08/11/18 08:29








Intake and Output: 


 











 08/11/18 08/11/18





 06:59 18:59


 


Output Total 1200 


 


Balance -1200 














- Medications


Medications: 


 Current Medications





Acetaminophen (Tylenol 325mg Tab)  650 mg PO Q4H PRN


   PRN Reason: headache and pain


   Last Admin: 08/11/18 05:58 Dose:  650 mg


Albuterol/Ipratropium (Duoneb 3 Mg/0.5 Mg (3 Ml) Ud)  3 ml IH Q2H PRN


   PRN Reason: Shortness of Breath


Amlodipine Besylate (Norvasc)  5 mg PO DAILY [facility]


   Last Admin: 08/11/18 10:13 Dose:  5 mg


Aspirin (Aspirin Chewable)  81 mg PO DAILY [facility]


   Last Admin: 08/11/18 10:12 Dose:  81 mg


Atorvastatin Calcium (Lipitor)  40 mg PO DAILY [facility]


   Last Admin: 08/11/18 10:12 Dose:  40 mg


Carvedilol (Coreg)  6.25 mg PO BID [facility]


   Last Admin: 08/11/18 10:15 Dose:  6.25 mg


Cholecalciferol (Vitamin D)  1,000 intlu PO DAILY [facility]


   Last Admin: 08/11/18 10:13 Dose:  1,000 intlu


Clopidogrel Bisulfate (Plavix)  75 mg PO DAILY [facility]


   Last Admin: 08/11/18 10:13 Dose:  75 mg


Hydrochlorothiazide (Hydrodiuril)  25 mg PO DAILY [facility]


   Last Admin: 08/11/18 10:12 Dose:  25 mg


Insulin Human Regular (Humulin R Low)  0 units SC ACHS [facility]


   PRN Reason: Protocol


   Last Admin: 08/10/18 14:08 Dose:  Not Given


Levetiracetam (Keppra)  500 mg PO BID [facility]


   Last Admin: 08/11/18 10:12 Dose:  500 mg


Losartan Potassium (Cozaar)  100 mg PO DAILY [facility]


   Last Admin: 08/11/18 11:01 Dose:  100 mg


Meclizine HCl (Antivert)  25 mg PO TID [facility]


   Last Admin: 08/11/18 10:15 Dose:  25 mg


Pantoprazole Sodium (Protonix Ec Tab)  40 mg PO 0600 [facility]


   Last Admin: 08/11/18 06:01 Dose:  40 mg


Vitamin A (Vitamin A & D Oint Ud Foilpak)  1 ea TOP Q2 PRN


   PRN Reason: Dry mouth











- Labs


Labs: 


 





 08/11/18 05:00 





 08/11/18 05:00 





 











PT  11.4 SECONDS (9.4-12.5)   08/08/18  14:30    


 


INR  1.00   08/08/18  14:30    


 


APTT  32.5 Seconds (25.1-36.5)   08/08/18  14:30    














Attending/Attestation





- Attestation


I have personally seen and examined this patient.: Yes


I have fully participated in the care of the patient.: Yes


I have reviewed all pertinent clinical information, including history, physical 

exam and plan: Yes


Notes (Text): 





08/11/18 12:57


Medical record note made by the resident after discussion with my direction and 

input after the patient was personally seen and examined by me. I have reviewed 

the chart and agree that the record accurately reflects by personal performance 

of the history, physical exam, data review, and medical decision-making, in the 

course for the patient. I have also personally directed the plan of care.





52 year old female with  PMH of significant for previous CVA with residual 

Right Lower extremity weakness, seizure disorder, DM2, HTN, TRISTIN, asthma, and 

vertigo was admitted  with worsening of right lower extremity weakness and  

dysarthria  , patient was in her Neurologist office at that time.CT head was 

negative.Patient is SP TPA, Patient is still having dysarthia today but better 

than before.Right leg weakness is 2/5 unchanged.MRI of brain today is negative 

for acute infarct.Antiplatelet medications will be restarted 24 hour after TPA.





Blood pressure is stable.





We will follow up Echo 


Management plan was discussed in detail with patient. Education was provided.

## 2018-08-09 NOTE — CP.PCM.PN
Subjective





- Date & Time of Evaluation


Date of Evaluation: 08/09/18


Time of Evaluation: 09:39





- Subjective


Subjective: 





Golden [place] PGY 2 Neurology Progress Note for Dr. Kahn





Patient was seen and examined at bedside in ICU. she had MRI head/neck earlier 

this morning. She is unable to undergo CTA due to peripheral line issues per 

nursing staff. The patient states that her speech is getting better and that 

her motor skills are restored. Patient states that she had a CVA in 5/2017 and 

TIA in 8/2017 that left her with a right foot drop, but no other focal 

deficits. Her last seizure was in 5/2018, and she is on Keppra. She was in Dr. Reed's office getting an EEG done when she felt her body shake and her jaw 

deviate to the right. she denied any preceding aura of headaches, lights, 

sounds or any other neurological symptoms. After EMS was called, she was moved 

to a chair and in the process noticed that her right leg was weak and could not 

support her, however, she states this felt different than her usual foot drop. 

Currently, she states she has a headache but denies any changes in hearing, 

dizziness, chest pain, shortness of breath, weakness. 








Objective





- Vital Signs/Intake and Output


Vital Signs (last 24 hours): 


 











Temp Pulse Resp BP Pulse Ox


 


 98.4 F   69   16   112/67   94 L


 


 08/09/18 07:47  08/09/18 07:47  08/09/18 07:47  08/09/18 07:47  08/09/18 06:45








Intake and Output: 


 











 08/09/18 08/09/18





 06:59 18:59


 


Intake Total 1300 


 


Output Total 400 


 


Balance 900 














- Medications


Medications: 


 Current Medications





Acetaminophen (Tylenol 325mg Tab)  650 mg PO Q4H PRN


   PRN Reason: headache and pain


   Last Admin: 08/09/18 00:23 Dose:  650 mg


Albuterol/Ipratropium (Duoneb 3 Mg/0.5 Mg (3 Ml) Ud)  3 ml IH Q2H PRN


   PRN Reason: Shortness of Breath


Sodium Chloride (Sodium Chloride 0.9%)  1,000 mls @ 100 mls/hr IV .Q10H [facility]


   Last Admin: 08/09/18 05:52 Dose:  100 mls/hr


Levetiracetam (Keppra 500mg Ivpb)  500 mg in 100 mls @ 400 mls/hr IVPB Q12 [facility]


   Last Admin: 08/09/18 09:30 Dose:  400 mls/hr


Insulin Human Regular (Humulin R Low)  0 units SC Q6 [facility]


   PRN Reason: Protocol


   Last Admin: 08/09/18 00:00 Dose:  Not Given


Pantoprazole Sodium (Protonix Inj)  40 mg IVP DAILY [facility]


   Last Admin: 08/09/18 09:30 Dose:  40 mg


Vitamin A (Vitamin A & D Oint Ud Foilpak)  1 ea TOP Q2 PRN


   PRN Reason: Dry mouth











- Labs


Labs: 


 











PT  11.4 SECONDS (9.4-12.5)   08/08/18  14:30    


 


INR  1.00   08/08/18  14:30    


 


APTT  32.5 Seconds (25.1-36.5)   08/08/18  14:30    














- Constitutional


Appears: Well, Non-toxic, No Acute Distress





- Head Exam


Head Exam: NORMAL INSPECTION





- Eye Exam


Eye Exam: EOMI, Normal appearance, PERRL





- ENT Exam


ENT Exam: Mucous Membranes Dry





- Neck Exam


Neck Exam: Full ROM, Normal Inspection





- Respiratory Exam


Respiratory Exam: NORMAL BREATHING PATTERN.  absent: Rales, Rhonchi, Wheezes





- Cardiovascular Exam


Cardiovascular Exam: RRR, +S1, +S2





- GI/Abdominal Exam


GI & Abdominal Exam: Soft.  absent: Distended, Tenderness





- Extremities Exam


Extremities Exam: Full ROM, Normal Inspection.  absent: Pedal Edema


Additional comments: 





SCDs on b/l








- Back Exam


Back Exam: NORMAL INSPECTION





- Neurological Exam


Neurological Exam: Alert, Awake, CN II-XII Intact (grossly), Oriented x3.  

absent: Motor Sensory Deficit


Neuro motor strength exam: Left Upper Extremity: 5, Right Upper Extremity: 5, 

Left Lower Extremity: 5, Right Lower Extremity: 5 (plantarflexion 0/5; 

dorsiflexion 4/5)


Additional comments: 





no dysmetria noted


dysarthria noted, but improved from prior exam








- Psychiatric Exam


Psychiatric exam: Normal Mood





- Skin


Skin Exam: Warm





Assessment and Plan





- Assessment and Plan (Free Text)


Assessment: 








52 year old female with a PMH of CVA/TIA (2017) with residual R foot drop, 

seizures (last in 5/2018), morbid obesity, HTN, TRISTIN, asthma, and vertigo who 

presented with right sided weakness, dysarthria and facial drooping that 

started around 1430 while patient was at her neurologist's office for EEG. CODE 

STROKE was called, and patient underwent CT Head that showed no acute findings 

or evidence of bleed. Decision was made to administer tPA (90mg total), and 

infusion was started at around 1630. Patient was monitored overnight in ICU 

with no neurological changes noted. Patient is under bleeding precautions with 

strictly no IV draws, tubes, or line placement for 24 hrs; antiplatelets are 

also to be avoided.








Plan: 





Acute CVA s/p tPA, symptoms improving


- MRI head done today showing no acute intracranial abnormality. No evidence of 

an acute or early subacute infarction. Mild chronic microangiopathic changes. 

Mild cerebellar tonsillar ectopia.


- MRA neck showed normal MR Angiography of the neck.


- CT Head today at 1630 to f/u tPA transfusion to make sure no bleeding


- continue bleeding precautions w/ no IV sticks, lines/tube placement until 1630


- avoid antiplatelet use 


- Speech & Swallow eval pending


- PT/OT


- NPO Diet


- Fall and Aspiration precautions


- ICU placement w/ monitoring of vitals and neurochecks q1 hr till 1630


- Echo pending


- Lipid panel and A1c show no evidence of good glycemic and lipid control


- PTX/SCDs





Hx Seizures


- seizure precautions


- cont Keppra 500mg IV q12





Further recs per Dr. Kahn





Case was reviewed and discussed with attending, Dr. Kahn

## 2018-08-09 NOTE — MRI
Date of service: 



08/09/2018



PROCEDURE:  MRI BRAIN WITHOUT CONTRAST



HISTORY:

stroke



COMPARISON:

Noncontrast head CT from 08/08/2018. 



TECHNIQUE:

Multiplanar, multisequence MR images of the brain were obtained 

without intravenous contrast enhancement.



FINDINGS:



HEMORRHAGE:

None



DWI:

No evidence of an acute or early subacute infarction.



BRAIN PARENCHYMA:

There are mild chronic microangiopathic changes.  There is no mass, 

mass effect or abnormal extra-axial fluid collection.  There is no 

territorial infarction. There is mild cerebellar tonsillar ectopia. 

There is a partially empty sella. 



VENTRICLES:

The ventricles are normal in size, shape and configuration.



CRANIUM:

There is normal bone marrow signal pattern.



ORBITS:

Grossly unremarkable.



PARANASAL SINUSES/MASTOIDS:

Predominantly clear.



VASCULAR SYSTEM:

There are normal signal voids in the larger intracranial arteries. 



OTHER FINDINGS:

None. 



IMPRESSION:

No acute intracranial abnormality.



Mild chronic microangiopathic changes.



Mild cerebellar tonsillar ectopia.

## 2018-08-09 NOTE — MRI
Date of service: 



08/09/2018



PROCEDURE:  MR Angiography of the neck without contrast



HISTORY:

CVA



COMPARISON:

None available. 



TECHNIQUE:

3D Time-of-flight angiography of the neck was performed. Rotating 

maximum intensity projection images of the cervical carotid and 

vertebral arteries were generated. The origins of the common carotid 

arteries were not visualized, which is a limitation inherent to the 

non-contrast time of flight technique.



FINDINGS:



RIGHT CAROTID ARTERIES:

Common Carotid Artery: Normal.



Carotid Bifurcation: Normal.



Internal Carotid Artery:Normal.



External Carotid Artery (proximal branches): Normal.



LEFT CAROTID ARTERIES:

Common Carotid Artery: Normal.



Carotid Bifurcation: Normal.



Internal Carotid Artery:Normal.



External Carotid Artery (proximal branches): Normal.



VERTEBRAL ARTERIES:

Right Vertebral Artery: Normal.



Left Vertebral Artery: Normal.



OTHER FINDINGS:

None.



IMPRESSION:

Normal MR Angiography of the neck.

## 2018-08-09 NOTE — CT
Date of service: 



08/09/2018



PROCEDURE:  CT HEAD WITHOUT CONTRAST.



HISTORY:

CODE STROKE f/u tPA



COMPARISON:

8/8/2018



TECHNIQUE:

Axial computed tomography images were obtained through the head/brain 

without intravenous contrast.  



Radiation dose:



Total exam DLP = 821.56 mGy-cm.



This CT exam was performed using one or more of the following dose 

reduction techniques: Automated exposure control, adjustment of the 

mA and/or kV according to patient size, and/or use of iterative 

reconstruction technique.



FINDINGS:



HEMORRHAGE:

No intracranial hemorrhage. 



BRAIN:

No mass effect or edema.  No atrophy or chronic microvascular 

ischemic changes.



VENTRICLES:

Unremarkable. No hydrocephalus. 



CALVARIUM:

Unremarkable.



PARANASAL SINUSES:

Unremarkable as visualized. No significant inflammatory changes.



MASTOID AIR CELLS:

Unremarkable as visualized. No inflammatory changes.



OTHER FINDINGS:

None.



IMPRESSION:

No intracranial hemorrhage.  No evidence of acute infarct.  

Unremarkable examination.

## 2018-08-09 NOTE — CARD
--------------- APPROVED REPORT --------------





Date of service: 08/08/2018



EKG Measurement

Heart [id number]

SD 150P40

[id number]

[id number]

[id number]



<Conclusion>

Normal sinus rhythm

Minimal voltage criteria for LVH, may be normal variant

Nonspecific T wave abnormality

Abnormal ECG

## 2018-08-09 NOTE — CP.CCUPN
<MariannaKarlo - Last Filed: 08/09/18 10:39>





CCU Subjective





- Physician Review


Events Since Last Encounter (Free Text): 


Karlo Cabral, PGY-1 ICU Progress Note





Patient seen and examined at bedside this morning. No acute overnight events. 

Patient's neurological status is currently at baseline with chronic dysarthria 

from previous history of CVA. Will transfer at 24 hours post tPA (approximately 

5pm today) if stable and per neurology/cardiology recommendations. Patient 

denies any complaints at this time except for chronic unchanged headache. 

Denies CP, SOB, abdominal pain, urinary complaints, muscle weakness, numbness 

or tingling and back pain. 12 point ROS noted here, otherwise negative. 














CCU Objective





- Vital Signs / Intake & Output


Vital Signs (Last 4 hours): 


Vital Signs











  Temp Pulse Resp BP Pulse Ox


 


 08/09/18 09:47  97.8 F  69  12  127/71 


 


 08/09/18 09:31   72  23  113/81  96


 


 08/09/18 09:17   77  24  108/69  95


 


 08/09/18 09:15   74  27 H  


 


 08/09/18 08:47  97.8 F  76  25 H  108/69 


 


 08/09/18 08:00   72  23  115/73  98


 


 08/09/18 07:47  98.4 F  69  16  112/67 


 


 08/09/18 07:45   63  21  112/67  97


 


 08/09/18 07:30   78  22  127/63  97


 


 08/09/18 07:15   73   115/78  95


 


 08/09/18 07:00     110/62 


 


 08/09/18 06:59   62  18   95


 


 08/09/18 06:47   62  20  106/62 


 


 08/09/18 06:45   78  21  106/62  94 L











Intake and Output (Last 8hrs): 


 Intake & Output











 08/08/18 08/09/18 08/09/18





 22:59 06:59 14:59


 


Intake Total  1300 


 


Output Total  400 


 


Balance  900 


 


Weight  251 lb 260 lb 8 oz


 


Intake:   


 


  IV  1200 


 


    Left Upper arm  1200 


 


  Oral  100 


 


Output:   


 


  Urine  400 


 


    Urine, Voided  400 


 


Other:   


 


  Voiding Method  Toilet 














- Physical Exam


Head: Positive for: Atraumatic, Normocephalic


Pupils: Positive for: PERRL


Extroacular Muscles: Positive for: EOMI


Conjunctiva: Positive for: Normal


Mouth: Positive for: Moist Mucous Membranes


Neck: Positive for: Normal Range of Motion


Respiratory/Chest: Positive for: Clear to Auscultation, Good Air Exchange.  

Negative for: Respiratory Distress, Accessory Muscle Use


Cardiovascular: Positive for: Regular Rate and Rhythm, Normal S1, S2.  Negative 

for: Murmurs


Abdomen: Positive for: Normal Bowel Sounds.  Negative for: Tenderness, 

Distention, Peritoneal Signs


Back: Positive for: Normal Inspection


Upper Extremity: Positive for: Normal Inspection, Normal ROM.  Negative for: 

Cyanosis, Edema


Lower Extremity: Positive for: Other (can lift lower extremities with muscle 

strenfth testing 5/5 B/L of LE).  Negative for: Edema, Normal ROM


Neurological: Positive for: GCS=15, Other (residual chronic Left facial droop, 

slurred speech)


Skin: Positive for: Warm, Dry, Normal Color.  Negative for: Rashes


Psychiatric: Positive for: Alert, Oriented x 3, Normal Insight, Normal 

Concentration





- Medications


Active Medications: 


Active Medications











Generic Name Dose Route Start Last Admin





  Trade Name Freq  PRN Reason Stop Dose Admin


 


Acetaminophen  650 mg  08/09/18 00:15  08/09/18 00:23





  Tylenol 325mg Tab  PO   650 mg





  Q4H PRN   Administration





  headache and pain   


 


Albuterol/Ipratropium  3 ml  08/08/18 17:16  





  Duoneb 3 Mg/0.5 Mg (3 Ml) Ud  IH   





  Q2H PRN   





  Shortness of Breath   


 


Sodium Chloride  1,000 mls @ 100 mls/hr  08/08/18 15:30  08/09/18 05:52





  Sodium Chloride 0.9%  IV   100 mls/hr





  .Q10H TYLER   Administration


 


Levetiracetam  500 mg in 100 mls @ 400 mls/hr  08/08/18 22:00  08/09/18 09:30





  Keppra 500mg Ivpb  IVPB   400 mls/hr





  Q12 TYLER   Administration


 


Insulin Human Regular  0 units  08/09/18 00:00  08/09/18 00:00





  Humulin R Low  SC   Not Given





  Q6 TYLER   





  Protocol   


 


Pantoprazole Sodium  40 mg  08/09/18 10:00  08/09/18 09:30





  Protonix Inj  IVP   40 mg





  DAILY TYLER   Administration


 


Vitamin A  1 ea  08/08/18 17:16  





  Vitamin A & D Oint Ud Foilpak  TOP   





  Q2 PRN   





  Dry mouth   














- Patient Studies


Lab Studies: 


 Lab Studies











  08/08/18 Range/Units





  17:00 


 


TSH 3rd Generation  5.38 H  (0.46-4.68)  mIU/mL








 Laboratory Results - last 24 hr











  08/08/18





  17:00


 


TSH 3rd Generation  5.38 H











Fingerstick Blood Sugar Results: 90





Critical Care Progress Note





- Nutrition


Nutrition: 


 Nutrition











 Category Date Time Status


 


 NPO Diet [DIET] Diets  08/08/18 Dinner Ordered














Assessment/Plan





- Assessment and Plan (Free Text)


Assessment: 





Assessment: This is a 52 year old female with PMH od previous CVA with RLE 

residual weakness, history of seizure on keppra, DM2, HTN, TRISTIN, asthma and 

vertigo presenting to ICU for management of CVA s/p tPA vs Maninder's paralysis vs 

history of seizure. Will transfer at 24 hours post tPA (approximately 5pm today

) if stable and per neurology/cardiology recommendations. 








Plan:





Neuro:


-maintain normothermia


-AAO x3, moving extremities spontaneously past midline. Has chronic residual 

slurred speech; back to baseline neurologically


-Head CT 8/8: no acute findings 


-MRI head today shows no acute intracranial abnormality, mild chronic 

microangiopathic changes, mild cerebellar tonsillar ectopia 


-Neck MRA: Normal MR angiography of neck


-on keppra 500 IV Q12


-swallow study


-NIHSS stroke scale in ED was 3


-Neurology on consult, Dr Reed





Cardio:


-maintain MAP>65


-duonebs prn


-HR and BP noted, reviewed and trended. Overnight HR 60-80 and -120/50-

60. 


-will monitor vitals including HR and BP closely


-echo pending


-cardiology on consult, Dr. Eller





Lungs:


-SaO2 >90%


-supplementary O2 PRN


-CXR: no active disease





Renal:


-maintain euvolemia


-avoid nephrotoxic agents, hypochloremia


-replace electrolytes as needed 





Heme:


-Bleeding precautions with no IV sticks and line placement until 5pm


-avoid anti platelet use


-received tPA in ED yesterday


-PT/INR yesterday was 11.4/1


-SCD





Endo:


-maintain euglycemia


-on insulin human regular





ID:


-WBC WNL yesterday. CBC pending for today. Currently afebrile. No concern at 

this time





GI:


-NPO diet


-GI prophylaxis with protonix








<Jose Holder - Last Filed: 08/09/18 12:08>





CCU Objective





- Vital Signs / Intake & Output


Vital Signs (Last 4 hours): 


Vital Signs











  Temp Pulse Resp BP Pulse Ox


 


 08/09/18 09:47  97.8 F  69  12  127/71 


 


 08/09/18 09:31   72  23  113/81  96


 


 08/09/18 09:17   77  24  108/69  95


 


 08/09/18 09:15   74  27 H  


 


 08/09/18 08:47  97.8 F  76  25 H  108/69 











Intake and Output (Last 8hrs): 


 Intake & Output











 08/08/18 08/09/18 08/09/18





 22:59 06:59 14:59


 


Intake Total  1300 


 


Output Total  400 


 


Balance  900 


 


Weight  251 lb 260 lb 8 oz


 


Intake:   


 


  IV  1200 


 


    Left Upper arm  1200 


 


  Oral  100 


 


Output:   


 


  Urine  400 


 


    Urine, Voided  400 


 


Other:   


 


  Voiding Method  Toilet 














- Medications


Active Medications: 


Active Medications











Generic Name Dose Route Start Last Admin





  Trade Name Freq  PRN Reason Stop Dose Admin


 


Acetaminophen  650 mg  08/09/18 00:15  08/09/18 00:23





  Tylenol 325mg Tab  PO   650 mg





  Q4H PRN   Administration





  headache and pain   


 


Albuterol/Ipratropium  3 ml  08/08/18 17:16  





  Duoneb 3 Mg/0.5 Mg (3 Ml) Ud  IH   





  Q2H PRN   





  Shortness of Breath   


 


Aspirin  81 mg  08/09/18 19:30  





  Ecotrin  PO   





  DAILY TYLER   


 


Sodium Chloride  1,000 mls @ 100 mls/hr  08/08/18 15:30  08/09/18 05:52





  Sodium Chloride 0.9%  IV   100 mls/hr





  .Q10H TYLER   Administration


 


Levetiracetam  500 mg in 100 mls @ 400 mls/hr  08/08/18 22:00  08/09/18 09:30





  Keppra 500mg Ivpb  IVPB   400 mls/hr





  Q12 TYLER   Administration


 


Insulin Human Regular  0 units  08/09/18 00:00  08/09/18 00:00





  Humulin R Low  SC   Not Given





  Q6 TYLER   





  Protocol   


 


Pantoprazole Sodium  40 mg  08/09/18 10:00  08/09/18 09:30





  Protonix Inj  IVP   40 mg





  DAILY TYLER   Administration


 


Vitamin A  1 ea  08/08/18 17:16  





  Vitamin A & D Oint Ud Foilpak  TOP   





  Q2 PRN   





  Dry mouth   














- Patient Studies


Lab Studies: 


 Lab Studies











  08/08/18 Range/Units





  17:00 


 


TSH 3rd Generation  5.38 H  (0.46-4.68)  mIU/mL








 Laboratory Results - last 24 hr











  08/08/18





  17:00


 


TSH 3rd Generation  5.38 H














Critical Care Progress Note





- Nutrition


Nutrition: 


 Nutrition











 Category Date Time Status


 


 NPO Diet [DIET] Diets  08/08/18 Dinner Ordered














Assessment/Plan





- Assessment and Plan (Free Text)


Assessment: 


Patient seen and examined on rounds, agree with note with following additions/

exceptions:


Patient is 51yo female w/PMH of previous CVA with RLE residual weakness, 

history of seizure on keppra, DM2, HTN, TRISTIN, asthma and vertigo admitted to ICU 

for management of CVA s/p tPA. Currently afebrile, BP stable, comfortable in 

NAD.


Labs, imaging, chart reviewed


MRI Brain MRA neck done


Neuro following


ASA, Statin


BP control


ECHO


Q1hr neuro checks for 24hr


follow up Neuro


monitor for bleeding


GI ppx


Monitor in MICU

## 2018-08-10 LAB
ALBUMIN SERPL-MCNC: 3.4 G/DL (ref 3–4.8)
ALBUMIN/GLOB SERPL: 1.2 {RATIO} (ref 1.1–1.8)
ALT SERPL-CCNC: 22 U/L (ref 7–56)
AST SERPL-CCNC: 20 U/L (ref 14–36)
BASOPHILS # BLD AUTO: 0.03 K/MM3 (ref 0–2)
BASOPHILS NFR BLD: 0.5 % (ref 0–3)
BUN SERPL-MCNC: 7 MG/DL (ref 7–21)
CALCIUM SERPL-MCNC: 8.5 MG/DL (ref 8.4–10.5)
EOSINOPHIL # BLD: 0.2 10*3/UL (ref 0–0.7)
EOSINOPHIL NFR BLD: 4 % (ref 1.5–5)
ERYTHROCYTE [DISTWIDTH] IN BLOOD BY AUTOMATED COUNT: 15.6 % (ref 11.5–14.5)
GFR NON-AFRICAN AMERICAN: > 60
GRANULOCYTES # BLD: 2.92 10*3/UL (ref 1.4–6.5)
GRANULOCYTES NFR BLD: 52.7 % (ref 50–68)
HGB BLD-MCNC: 11.1 G/DL (ref 12–16)
LYMPHOCYTES # BLD: 1.9 10*3/UL (ref 1.2–3.4)
LYMPHOCYTES NFR BLD AUTO: 33.3 % (ref 22–35)
MCH RBC QN AUTO: 25.2 PG (ref 25–35)
MCHC RBC AUTO-ENTMCNC: 32.6 G/DL (ref 31–37)
MCV RBC AUTO: 77.3 FL (ref 80–105)
MONOCYTES # BLD AUTO: 0.5 10*3/UL (ref 0.1–0.6)
MONOCYTES NFR BLD: 9.5 % (ref 1–6)
PLATELET # BLD: 292 10^3/UL (ref 120–450)
PMV BLD AUTO: 9.7 FL (ref 7–11)
RBC # BLD AUTO: 4.41 10^6/UL (ref 3.5–6.1)
WBC # BLD AUTO: 5.6 10^3/UL (ref 4.5–11)

## 2018-08-10 RX ADMIN — INSULIN HUMAN SCH: 100 INJECTION, SOLUTION PARENTERAL at 07:53

## 2018-08-10 RX ADMIN — INSULIN HUMAN SCH: 100 INJECTION, SOLUTION PARENTERAL at 14:08

## 2018-08-10 RX ADMIN — VITAMIN D, TAB 1000IU (100/BT) SCH INTLU: 25 TAB at 09:50

## 2018-08-10 NOTE — PN
Copied To:  Shannon Eller MD

Attending MD:  Shannon Eller MD



DATE:  08/10/2018



REASON FOR THE CONSULTATION:  Acute CVA, right-sided weakness, cardiac

evaluation, status post tPA.



SUBJECTIVE:  Patient denies any chest pain.  Feels a lot better.  Speech is

much improved.



OBJECTIVE:

GENERAL:  Significant improvement in speech, awake and alert, reading the

newspaper.

VITAL SIGNS:  Temperature afebrile, heart rate 56, blood pressure 115/61.

HEENT:  PERRLA.  Extraocular muscles intact.

NECK:  Supple.  No carotid bruit or thyromegaly.

CHEST:  Clear to auscultation.

HEART:  S1 and S2 regular.

ABDOMEN:  Soft.

EXTREMITIES:  Clubbing and cyanosis, negative.



LABORATORY DATA:  Blood workup:  WBC 5.6, hemoglobin 11.1, hematocrit 34.1,

platelet count 292.  Chemistry shows sodium 145, potassium 3.5, chloride

112, carbon dioxide 25, anion gap of 11, BUN 7, creatinine 0.7.



IMPRESSION:  Acute cerebrovascular accident with right-sided weakness;

dysarthria; slurred speech, status post tissue plasminogen activator,

improving; morbid obesity; hypertension; type 2 diabetes; hyperlipidemia. 

Patient had echocardiography done yesterday, preserved left ventricular

function.  Awaiting _____ bubble study to rule out any patent foramen ovale

because the patient is having recurrent cerebrovascular accident.  MRI of

the brain shows no acute intracranial abnormality.



RECOMMENDATIONS:  Continue aggressive control of blood pressure.  Continue

lifestyle modification, risk factor for coronary artery disease.  Continue

atorvastatin, Coreg, aspirin and other anticoagulation as per neurologist. 

We will discontinue IV fluid.



Thank you Dr. Cummings for providing us the opportunity in taking care of the

patient, _____ Andrey.







__________________________________________

Shannon Eller MD



DD:  08/10/2018 9:12:36

DT:  08/10/2018 10:31:54

Job # [id number]

## 2018-08-10 NOTE — CP.PCM.PN
<Jerzy Bagley - Last Filed: 08/10/18 12:32>





Subjective





- Date & Time of Evaluation


Date of Evaluation: 08/10/18


Time of Evaluation: 12:00





- Subjective


Subjective: 


Jerzy Bagley PGY-1 Progress Note for Hospitalist Service





Patient seen and evaluated at bedside. Patient denies any acute events this 

morning. Patient is sitting up in bed comfortably eating breakfast. Patient 

admits to some headaches but denies chest pain, palpitations, shortness of 

breath, blurry vision, abdominal pain, confusion, and changes in urinary or 

bowel habits.











Objective





- Vital Signs/Intake and Output


Vital Signs (last 24 hours): 


 











Temp Pulse Resp BP Pulse Ox


 


 97.4 F L  69   23   146/89   97 


 


 08/10/18 04:00  08/10/18 10:31  08/09/18 18:45  08/10/18 10:31  08/10/18 10:31








Intake and Output: 


 











 08/10/18 08/10/18





 06:59 18:59


 


Intake Total 2850 


 


Output Total 1550 


 


Balance 1300 














- Medications


Medications: 


 Current Medications





Acetaminophen (Tylenol 325mg Tab)  650 mg PO Q4H PRN


   PRN Reason: headache and pain


   Last Admin: 08/10/18 02:56 Dose:  650 mg


Albuterol/Ipratropium (Duoneb 3 Mg/0.5 Mg (3 Ml) Ud)  3 ml IH Q2H PRN


   PRN Reason: Shortness of Breath


Amlodipine Besylate (Norvasc)  5 mg PO DAILY [facility]


   Last Admin: 08/10/18 09:50 Dose:  5 mg


Aspirin (Aspirin Chewable)  81 mg PO DAILY [facility]


Atorvastatin Calcium (Lipitor)  40 mg PO DAILY [facility]


   Last Admin: 08/10/18 09:51 Dose:  40 mg


Carvedilol (Coreg)  6.25 mg PO BID [facility]


   Last Admin: 08/10/18 09:51 Dose:  6.25 mg


Cholecalciferol (Vitamin D)  1,000 intlu PO DAILY [facility]


   Last Admin: 08/10/18 09:50 Dose:  1,000 intlu


Clopidogrel Bisulfate (Plavix)  75 mg PO DAILY [facility]


Hydrochlorothiazide (Hydrodiuril)  25 mg PO DAILY [facility]


   Last Admin: 08/10/18 09:51 Dose:  25 mg


Sodium Chloride (Sodium Chloride 0.9%)  1,000 mls @ 100 mls/hr IV .Q10H [facility]


   Last Admin: 08/09/18 16:55 Dose:  100 mls/hr


Insulin Human Regular (Humulin R Low)  0 units SC ACHS [facility]


   PRN Reason: Protocol


   Last Admin: 08/10/18 07:53 Dose:  Not Given


Levetiracetam (Keppra)  500 mg PO BID [facility]


   Last Admin: 08/10/18 09:51 Dose:  500 mg


Losartan Potassium (Cozaar)  100 mg PO DAILY [facility]


Meclizine HCl (Antivert)  25 mg PO TID [facility]


   Last Admin: 08/10/18 09:51 Dose:  25 mg


Ranolazine [Ranexa] (500 Mg   (Home Med))  500 mg PO BID [facility]


Pantoprazole Sodium (Protonix Ec Tab)  40 mg PO 0600 [facility]


Vitamin A (Vitamin A & D Oint Ud Foilpak)  1 ea TOP Q2 PRN


   PRN Reason: Dry mouth











- Labs


Labs: 


 





 08/10/18 05:45 





 08/10/18 05:45 





 











PT  11.4 SECONDS (9.4-12.5)   08/08/18  14:30    


 


INR  1.00   08/08/18  14:30    


 


APTT  32.5 Seconds (25.1-36.5)   08/08/18  14:30    











Physical Exam





- Constitutional


Appears: Non-toxic, No Acute Distress





- Head Exam


Head Exam: ATRAUMATIC, NORMOCEPHALIC


Additional comments: 


Right sided facial droop





- Eye Exam


Eye Exam: EOMI, Normal appearance, PERRL.  absent: Conjunctival injection, 

Nystagmus, Periorbital swelling, Periorbital tenderness, Scleral icterus


Pupil Exam: NORMAL ACCOMODATION, PERRL.  absent: Fixed, Irregular, Miosis, 

Mydriatic, Unequal





- ENT Exam


ENT Exam: Mucous Membranes Moist, Normal External Ear Exam.  absent: Mucous 

Membranes Dry, Normal Oropharynx





- Neck Exam


Neck exam: Positive for: Full Rom, Normal Inspection.  Negative for: 

Lymphadenopathy, Meningismus, Tenderness, Thyromegaly





- Respiratory Exam


Respiratory Exam: Clear to Auscultation Bilateral, NORMAL BREATHING PATTERN.  

absent: Accessory Muscle Use, Chest Wall Tenderness, Decreased Breath Sounds, 

Prolonged Expiratory Phase, Rales, Rhonchi, Wheezes, Respiratory Distress, 

Stridor





- Cardiovascular Exam


Cardiovascular Exam: REGULAR RHYTHM, RRR, +S1, +S2.  absent: Bradycardia, 

Tachycardia, Clicks, Diastolic murmur, Gallop, Irregular Rhythm, JVD, Rubs, +S4

, Systolic Murmur





- GI/Abdominal Exam


GI & Abdominal Exam: Normal Bowel Sounds, Soft.  absent: Bruit, Diminished 

Bowel Sounds, Distended, Firm, Guarding, Hernia, Hyperactive Bowel Sounds, 

Hypoactive Bowel Sounds, Mass, Organomegaly, Pulsatile Mass, Rebound, Rigid, 

Tenderness





- Extremities Exam


Extremities exam: Decreased ROM in RLE, but could bring leg past midline. 

Difficulty lifting. Positive for: normal capillary refill, pedal pulses 

present.  Negative for: calf tenderness, joint swelling, normal inspection, 

pedal edema, tenderness





- Back Exam


Back exam: FULL ROM, NORMAL INSPECTION.  absent: CVA tenderness (L), CVA 

tenderness (R), muscle spasm, paraspinal tenderness, rash noted, tenderness, 

vertebral tenderness





- Neurological Exam


Neurological exam: Alert, Oriented x3





- Expanded Neurological Exam


  ** Expanded


Patient oriented to: person, place, time, situation


Speech: Improved clarity in Speech, resolved dysarthria


Cranial nerves: Facial Palsy w/Forehead Movement: Abnormal Right


Neuro motor strength exam: Left Upper Extremity: 4, Right Upper Extremity: 4, 

Left Lower Extremity: 4, Right Lower Extremity: 2


Sensation intact in UE and LE bilaterally





- Psychiatric Exam


Psychiatric exam: Normal Affect, Normal Mood





- Skin


Skin Exam: Dry, Intact, Normal Color, Warm





Assessment and Plan





- Assessment and Plan (Free Text)


Assessment: 


Assessment: 


52 year old female with a past medical history significant for previous CVA 

with residual RLE weakness, seizures, DM2, HTN, TRISTIN, asthma, and vertigo who 

presented with right sided weakness, dysarthria and facial drooping that 

started 8/8 during an EEG.





Plan: 


1. Dysarthria vs TIA


- Initial CT Head showed no intracranial abnormalities


- CXR showed no active disease


- Given tPA in ED 8/8


- MRA Neck normal


- Brain MRI showed no acute abnormality. Mild chronic microangiopathic changes 

along with mild cerebellar tonsillar ectopia


- Repeat Head CT showed no evidence of acute infarct


- Speech therapy: mild oral pharyngeal dysphagia w low to mod risk aspiration 

due to min oral motor weakness


- Passed Swallow Study and recommended mechanical soft bite size pieces


- continued home PO meds along with dual anti-platelet therapy 


- NS @ 100 cc/hr


- Fall, bleed and Aspiration precautions


- Transfer to telemetry


- Neurology and Cardiology consulted, all recommendations appreciated


- f/u Echo w bubble study to rule out PFO due to recurrent CVAs per cardiology (

Dr. Eller)





2. History of Seizures


- Keppra 500mg PO BID


- self-resolved. 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 No 

abnormal waves seen at time.


- Seizure precautions


- will continue to monitor





3. History of DM2


- ISS-Low and Accuchecks Q6


- A1c 5.8


- lipid panel wnl





4. History of Asthma


-Duonebs Q2 PRN


- will monitor





5. Hx of HTN


- carvedilol 6.25 BID, Norvasc 5


- will monitor





GI Prophylaxis: Protonix 40 PO


DVT Prophylaxis: SCD's 


Disposition: F/u PT recommendations regarding rehab





Patient seen, case reviewed, and plan discussed with Dr. Cummings.





Jerzy Bagley, PGY-1








<Shannon Cummings - Last Filed: 08/11/18 13:14>





Objective





- Vital Signs/Intake and Output


Vital Signs (last 24 hours): 


 











Temp Pulse Resp BP Pulse Ox


 


 98.3 F   58 L  21   126/72   96 


 


 08/10/18 20:00  08/11/18 10:15  08/11/18 08:29  08/11/18 10:15  08/11/18 08:29








Intake and Output: 


 











 08/11/18 08/11/18





 06:59 18:59


 


Output Total 1200 


 


Balance -1200 














- Medications


Medications: 


 Current Medications





Acetaminophen (Tylenol 325mg Tab)  650 mg PO Q4H PRN


   PRN Reason: headache and pain


   Last Admin: 08/11/18 05:58 Dose:  650 mg


Albuterol/Ipratropium (Duoneb 3 Mg/0.5 Mg (3 Ml) Ud)  3 ml IH Q2H PRN


   PRN Reason: Shortness of Breath


Amlodipine Besylate (Norvasc)  5 mg PO DAILY [facility]


   Last Admin: 08/11/18 10:13 Dose:  5 mg


Aspirin (Aspirin Chewable)  81 mg PO DAILY [facility]


   Last Admin: 08/11/18 10:12 Dose:  81 mg


Atorvastatin Calcium (Lipitor)  40 mg PO DAILY [facility]


   Last Admin: 08/11/18 10:12 Dose:  40 mg


Carvedilol (Coreg)  6.25 mg PO BID [facility]


   Last Admin: 08/11/18 10:15 Dose:  6.25 mg


Cholecalciferol (Vitamin D)  1,000 intlu PO DAILY [facility]


   Last Admin: 08/11/18 10:13 Dose:  1,000 intlu


Clopidogrel Bisulfate (Plavix)  75 mg PO DAILY [facility]


   Last Admin: 08/11/18 10:13 Dose:  75 mg


Hydrochlorothiazide (Hydrodiuril)  25 mg PO DAILY [facility]


   Last Admin: 08/11/18 10:12 Dose:  25 mg


Insulin Human Regular (Humulin R Low)  0 units SC ACHS [facility]


   PRN Reason: Protocol


   Last Admin: 08/10/18 14:08 Dose:  Not Given


Levetiracetam (Keppra)  500 mg PO BID [facility]


   Last Admin: 08/11/18 10:12 Dose:  500 mg


Losartan Potassium (Cozaar)  100 mg PO DAILY [facility]


   Last Admin: 08/11/18 11:01 Dose:  100 mg


Meclizine HCl (Antivert)  25 mg PO TID [facility]


   Last Admin: 08/11/18 10:15 Dose:  25 mg


Pantoprazole Sodium (Protonix Ec Tab)  40 mg PO 0600 [facility]


   Last Admin: 08/11/18 06:01 Dose:  40 mg


Vitamin A (Vitamin A & D Oint Ud Foilpak)  1 ea TOP Q2 PRN


   PRN Reason: Dry mouth











- Labs


Labs: 


 





 08/11/18 05:00 





 08/11/18 05:00 





 











PT  11.4 SECONDS (9.4-12.5)   08/08/18  14:30    


 


INR  1.00   08/08/18  14:30    


 


APTT  32.5 Seconds (25.1-36.5)   08/08/18  14:30    














Attending/Attestation





- Attestation


I have personally seen and examined this patient.: Yes


I have fully participated in the care of the patient.: Yes


I have reviewed all pertinent clinical information, including history, physical 

exam and plan: Yes


Notes (Text): 





08/11/18 13:04


Medical record note made by the resident after discussion with my direction and 

input after the patient was personally seen and examined by me. I have reviewed 

the chart and agree that the record accurately reflects by personal performance 

of the history, physical exam, data review, and medical decision-making, in the 

course for the patient. I have also personally directed the plan of care.





52 year old female with  PMH of significant for previous CVA with residual 

Right Lower extremity weakness, seizure disorder, DM2, HTN, TRISTIN, asthma, and 

vertigo was admitted  with worsening of right lower extremity weakness and  

dysarthria  , patient was in her Neurologist office at that time.CT head was 

negative.Patient is SP TPA, Repeat CT can of head and MRI of brain today is 

negative for acute infarct.MRA of neck is unremarkable.


Dysarthia is resolved.Right lower extremity weakness is persistent.





Blood pressure is stable.Echo was reviewed.There is no PFO or cardiac 

thrombus.Patient has been restarted on her Aspirin and Plavix.





Management plan was discussed in detail with patient. Education was provided.

## 2018-08-10 NOTE — CON
Copied To:  Shannon Eller MD

Attending MD:  Shannon Eller MD



DATE:  08/09/2018



REASON FOR CONSULTATION:  Acute CVA, right-sided weakness, cardiac

evaluation.



BRIEF CLINICAL HISTORY:  This is a 52-year-old female with past medical

history significant for CVA with residual right-sided weakness, diabetes,

hypertension, hyperlipidemia, obesity, obstructive sleep apnea, COPD,

vertigo, asthma with dysarthria, and who was in Dr. Reed's office,

neurologist, having EEG; the patient suddenly started shaking, became

dysarthric, ambulance was called, and patient was transferred to [facility] (formerly [facility]), where the patient got initial TPA and now currently the

patient is in ICU, being monitored, 129, bed 1.  Denies any chest pain,

denies any shortness of breath, denies any palpitation, but significantly

dysarthric and complains of right-sided weakness.



PAST MEDICAL HISTORY:  Significant for diabetes, hypertension,

hyperlipidemia, morbid obesity, history of CVA with right-sided weakness.



FAMILY HISTORY:  Noncontributory.



SOCIAL HISTORY:  Denies any smoking.  Denies any history of alcohol abuse.



ALLERGIES:  ALLERGY TO PENICILLIN.



CURRENT MEDICATIONS:  The patient at home was taking Glucophage 500 mg

daily, aspirin 81 mg daily, Flexeril 5 mg daily, meclizine 25 mg three

times a day, Plavix 75 mg daily, amlodipine 5 mg daily, losartan one tablet

daily, Coreg 6.25 mg daily, nitroglycerin _____ meloxicam 40 mg daily, and

atorvastatin 40 mg daily.



REVIEW OF SYSTEMS:  As per HPI.



PHYSICAL EXAMINATION:

VITAL SIGNS:  As follows, height of the patient 5 feet 5 inches, weight of

the patient is 260 pounds, body mass index 43.3 kg/m2.  Rest of the vitals,

temperature afebrile, heart rate 57, blood pressure 120/65.

HEENT:  PERRLA.  Extraocular muscles intact.

NECK:  Supple.  No carotid bruits or thyromegaly.

CHEST:  Clear to auscultation.

HEART:  S1 and S2 regular.

ABDOMEN:  Soft.

EXTREMITIES:  Clubbing and cyanosis negative.



LABORATORY DATA:  Blood workup as follows, WBC 7.7, hemoglobin _____

hematocrit 39.3, platelet count 367.  Chemistry shows sodium 145, potassium

4.2, chloride 106, carbon dioxide 26, anion gap of 18, BUN 13, creatinine

0.9.  EKG showed normal sinus, no acute ST-T changes, rate of 89.



IMPRESSION:  A 52-year-old morbidly obese female with a past medical

history of diabetes, hypertension, hyperlipidemia, chronic obstructive

pulmonary disease, obstructive sleep apnea, admitted with recurrent

cerebrovascular accident with right-sided weakness and dysarthria.  The

patient has a history of cerebrovascular accident, status post tissue

plasminogen activator.



RECOMMENDATIONS:  Anticoagulation as per Neurology and CVA protocol.  We

will get echo to assess LV function and also get bubble study to rule out

any PFO.  Get lipid profile, TSH and hemoglobin A1c.  Further

recommendations depending upon hospital course.  We will follow with you.



Thank you, Dr. Rivera for providing us the opportunity in taking care of

the patient, Lauren Balderas.





__________________________________________

Shannon Eller MD



DD:  08/09/2018 16:52:32

DT:  08/09/2018 22:40:38

Job # [id number]

## 2018-08-10 NOTE — CARD
--------------- APPROVED REPORT --------------





Date of service: 08/09/2018



EXAM: Two-dimensional and M-mode echocardiogram with Doppler and 

color Doppler.



INDICATION

ACUTE CVA( RECURRENT) R/O THROMBUS..PFO..BUBBLE STUDY



2D DIMENSIONS 

Left Atrium (2D)3.8   (1.6-4.0cm)IVSd1.1   (0.7-1.1cm)

LVDd5.0   (3.9-5.9cm)PWd1.1   (0.7-1.1cm)

LVDs3.3   (2.5-4.0cm)FS (%) 33.7   %

LVEF (%)62.0   (>50%)



M-Mode DIMENSIONS 

Aortic Root2.70   (2.2-3.7cm)Aortic Cusp Exc.1.90   (1.5-2.0cm)



Aortic Valve

AO Peak GR.11mmHg



Mitral Valve

E/A ratio0.0



TDI

E/Lateral E'0.0E/Medial E'0.0



Tricuspid Valve

RAP [id number] Peak Gr.[id number]



 LEFT VENTRICLE 

The left ventricle is normal size. There is normal left ventricular 

wall thickness. The left ventricular function is normal.EF-60-65% 

There is normal LV segmental wall motion. Transmitral Doppler flow 

pattern is Grade III-reversible restrictive diastolic dysfunction. No 

left ventricle thrombus noted on this study. There is no ventricular 

septal defect visualized. There is no left ventricular aneurysm. 

There is no mass noted in the left ventricle.



 RIGHT VENTRICLE 

The right ventricle is normal size. There is normal right ventricular 

wall thickness. The right ventricular systolic function is normal.



 ATRIA 

The left atrium size is normal. The right atrium size is normal. The 

interatrial septum is intact with no evidence for an atrial septal 

defect, By Bubble study and Color Flow.



 AORTIC VALVE 

The aortic valve is thickened but opens well. No aortic regurgitation 

is present. There is no aortic valvular stenosis. There is no aortic 

valvular vegetation.



 MITRAL VALVE 

The mitral valve is thickened but opens well. Mitral regurgitation is 

trace to mild. There is no mitral valve stenosis. There is no 

evidence of mitral valve prolapse.



 TRICUSPID VALVE 

The tricuspid valve leaflets are thickened , but open well. There is 

trace to mild tricuspid regurgitation.RVSP-39 mmof Hg There is no 

tricuspid valve stenosis. There is no tricuspid valve prolapse or 

vegetation.



 PULMONIC VALVE 

The pulmonary valve is normal in structure. There is no pulmonic 

valvular regurgitation. There is no pulmonic valvular stenosis.



 GREAT VESSELS 

The aortic root is normal in size. The ascending aorta is normal in 

size. The pulmonary artery is normal. The IVC is normal in size and 

collapses >50% with inspiration.



 PERICARDIAL EFFUSION 

There is no pleural effusion. There is no pericardial effusion.



<Conclusion>

Normal Chamber Size,Normal EF-60-65%

Mitral regurgitation is trace to mild.

There is trace to mild tricuspid regurgitation.RVSP-39 mmof Hg

The interatrial septum is intact with no evidence for an atrial 

septal defect, By Bubble study and Color Flow.

No thrombus or vegetation noted.

( Echo  completed with Bubble Study today).

## 2018-08-10 NOTE — CP.PCM.PN
Subjective





- Date & Time of Evaluation


Date of Evaluation: 08/10/18


Time of Evaluation: 09:01





- Subjective


Subjective: 





[facility] PGY 2 Neurology Progress Note for Dr. Kahn





Patient was seen and examined at bedside in ICU. There were no acute overnight 

events. CT Head following tPA administration did not show any hemorrhage or any 

acute changes. Patient's dysarthria has improved but her RLE still feels week. 

The patient denies any changes in vision/hearing, dizziness, headaches, chest 

pain, shortness of breath, fevers/chills, or n/v. 





While having the EEG done, the patient experienced another episode reported by 

nursing staff as seizures. Dr. Kahn was present in ICU at this time, and 

evaluated the patient. The patient was able to follow commands to put her right 

leg up and to squeeze the examiner's hands. There was no epileptic waves noted 

on the EEG at the time of this episode, similar to what happened prior to 

presentation at Dr. Reed's office. Dr. Kahn made no further recommendations 

regarding this non-epileptic seizure, and instructed nursing staff to call Dr. Reed (neurologist following the case) regarding further recommendations.





Objective





- Vital Signs/Intake and Output


Vital Signs (last 24 hours): 


 











Temp Pulse Resp BP Pulse Ox


 


 97.4 F L  72   23   137/78   96 


 


 08/10/18 04:00  08/10/18 09:51  08/09/18 18:45  08/10/18 09:51  08/09/18 18:45








Intake and Output: 


 











 08/10/18 08/10/18





 06:59 18:59


 


Intake Total 2850 


 


Output Total 1550 


 


Balance 1300 














- Medications


Medications: 


 Current Medications





Acetaminophen (Tylenol 325mg Tab)  650 mg PO Q4H PRN


   PRN Reason: headache and pain


   Last Admin: 08/10/18 02:56 Dose:  650 mg


Albuterol/Ipratropium (Duoneb 3 Mg/0.5 Mg (3 Ml) Ud)  3 ml IH Q2H PRN


   PRN Reason: Shortness of Breath


Amlodipine Besylate (Norvasc)  5 mg PO DAILY [facility]


   Last Admin: 08/10/18 09:50 Dose:  5 mg


Atorvastatin Calcium (Lipitor)  40 mg PO DAILY [facility]


   Last Admin: 08/10/18 09:51 Dose:  40 mg


Carvedilol (Coreg)  6.25 mg PO BID [facility]


   Last Admin: 08/10/18 09:51 Dose:  6.25 mg


Cholecalciferol (Vitamin D)  1,000 intlu PO DAILY [facility]


   Last Admin: 08/10/18 09:50 Dose:  1,000 intlu


Hydrochlorothiazide (Hydrodiuril)  25 mg PO DAILY [facility]


   Last Admin: 08/10/18 09:51 Dose:  25 mg


Sodium Chloride (Sodium Chloride 0.9%)  1,000 mls @ 100 mls/hr IV .Q10H [facility]


   Last Admin: 08/09/18 16:55 Dose:  100 mls/hr


Insulin Human Regular (Humulin R Low)  0 units SC ACHS [facility]


   PRN Reason: Protocol


   Last Admin: 08/10/18 07:53 Dose:  Not Given


Levetiracetam (Keppra)  500 mg PO BID [facility]


   Last Admin: 08/10/18 09:51 Dose:  500 mg


Losartan Potassium (Cozaar)  100 mg PO DAILY [facility]


Meclizine HCl (Antivert)  25 mg PO TID [facility]


   Last Admin: 08/10/18 09:51 Dose:  25 mg


Ranolazine [Ranexa] (500 Mg   (Home Med))  500 mg PO BID [facility]


Pantoprazole Sodium (Protonix Ec Tab)  40 mg PO 0600 [facility]


Vitamin A (Vitamin A & D Oint Ud Foilpak)  1 ea TOP Q2 PRN


   PRN Reason: Dry mouth











- Labs


Labs: 


 





 08/10/18 05:45 





 08/10/18 05:45 





 











PT  11.4 SECONDS (9.4-12.5)   08/08/18  14:30    


 


INR  1.00   08/08/18  14:30    


 


APTT  32.5 Seconds (25.1-36.5)   08/08/18  14:30    














- Additional Findings


Additional findings: 





- Constitutional


Appears: Well, Non-toxic, No Acute Distress





- Head Exam


Head Exam: NORMAL INSPECTION





- Eye Exam


Eye Exam: EOMI, Normal appearance, PERRL





- ENT Exam


ENT Exam: Mucous Membranes Dry





- Neck Exam


Neck Exam: Full ROM, Normal Inspection





- Respiratory Exam


Respiratory Exam: NORMAL BREATHING PATTERN.  absent: Rales, Rhonchi, Wheezes





- Cardiovascular Exam


Cardiovascular Exam: RRR, +S1, +S2





- GI/Abdominal Exam


GI & Abdominal Exam: Soft.  absent: Distended, Tenderness





- Extremities Exam


Extremities Exam: Full ROM, Normal Inspection.  absent: Pedal Edema


Additional comments: 





SCDs on b/l





- Back Exam


Back Exam: NORMAL INSPECTION





- Neurological Exam


Neurological Exam: Alert, Awake, CN II-XII Intact (grossly), Oriented x3.  

absent: Motor Sensory Deficit


Neuro motor strength exam: Left Upper Extremity: 5, Right Upper Extremity: 5, 

Left Lower Extremity: 5, Right Lower Extremity: 5 (plantarflexion 0/5; 

dorsiflexion 4/5)


Additional comments: 





no dysarthria noted





- Psychiatric Exam


Psychiatric exam: Normal Mood





- Skin


Skin Exam: Warm





Assessment and Plan





- Assessment and Plan (Free Text)


Assessment: 





52 year old female with a PMH of CVA/TIA (2017) with residual R foot drop, 

seizures (last in 5/2018), morbid obesity, HTN, TRISTIN, asthma, and vertigo who 

presented with right sided weakness, dysarthria and facial drooping. [facility] 

STROKE was called, and patient underwent CT Head that showed no acute findings 

or evidence of bleed. Decision was made to administer tPA (90mg total). Patient 

was monitored in ICU with bleeding precautions, with no neurological changes 

noted and f/u CT showing no hemorrhage. Patient is transferred out of ICU and 

should undergo PT/OT/Speech therapy. Symptoms likely due to non-epileptic 

seizure given normal EEG with seizure-like symptoms at times of stress.








Plan: 





Non-epileptic seizure


- Despite presentation similar to CVA, MRI showed no evidence of an acute or 

early subacute infarction. 


- Speech & Swallow eval done; recommending advanced bite size diet w/ thin 

liquids


- PT/OT


- Echo w/ bubble study pending, per cardio recs 


- Lipid panel and A1c show evidence of good glycemic and lipid control


- PTX/SCDs


- EEG done, pending official read


- seizure precautions


- cont Keppra 500mg PO q12


- further recs per Dr. Reed


- further recs per Dr. Kahn





Case was reviewed and discussed with attending, Dr. Kahn

## 2018-08-11 LAB
ALBUMIN SERPL-MCNC: 3.7 G/DL (ref 3–4.8)
ALBUMIN/GLOB SERPL: 1.3 {RATIO} (ref 1.1–1.8)
ALT SERPL-CCNC: 20 U/L (ref 7–56)
AST SERPL-CCNC: 34 U/L (ref 14–36)
BASOPHILS # BLD AUTO: 0.03 K/MM3 (ref 0–2)
BASOPHILS NFR BLD: 0.6 % (ref 0–3)
BUN SERPL-MCNC: 6 MG/DL (ref 7–21)
CALCIUM SERPL-MCNC: 9.2 MG/DL (ref 8.4–10.5)
EOSINOPHIL # BLD: 0.3 10*3/UL (ref 0–0.7)
EOSINOPHIL NFR BLD: 4.7 % (ref 1.5–5)
ERYTHROCYTE [DISTWIDTH] IN BLOOD BY AUTOMATED COUNT: 15.5 % (ref 11.5–14.5)
GFR NON-AFRICAN AMERICAN: > 60
GRANULOCYTES # BLD: 2.86 10*3/UL (ref 1.4–6.5)
GRANULOCYTES NFR BLD: 53.6 % (ref 50–68)
HGB BLD-MCNC: 11.3 G/DL (ref 12–16)
LYMPHOCYTES # BLD: 1.8 10*3/UL (ref 1.2–3.4)
LYMPHOCYTES NFR BLD AUTO: 33.2 % (ref 22–35)
MCH RBC QN AUTO: 24.6 PG (ref 25–35)
MCHC RBC AUTO-ENTMCNC: 32.2 G/DL (ref 31–37)
MCV RBC AUTO: 76.5 FL (ref 80–105)
MONOCYTES # BLD AUTO: 0.4 10*3/UL (ref 0.1–0.6)
MONOCYTES NFR BLD: 7.9 % (ref 1–6)
PLATELET # BLD: 296 10^3/UL (ref 120–450)
PMV BLD AUTO: 9.6 FL (ref 7–11)
RBC # BLD AUTO: 4.59 10^6/UL (ref 3.5–6.1)
WBC # BLD AUTO: 5.3 10^3/UL (ref 4.5–11)

## 2018-08-11 RX ADMIN — INSULIN HUMAN SCH: 100 INJECTION, SOLUTION PARENTERAL at 11:30

## 2018-08-11 RX ADMIN — VITAMIN D, TAB 1000IU (100/BT) SCH INTLU: 25 TAB at 10:13

## 2018-08-11 RX ADMIN — INSULIN HUMAN SCH: 100 INJECTION, SOLUTION PARENTERAL at 22:00

## 2018-08-11 RX ADMIN — INSULIN HUMAN SCH: 100 INJECTION, SOLUTION PARENTERAL at 17:08

## 2018-08-11 RX ADMIN — PANTOPRAZOLE SODIUM SCH MG: 40 TABLET, DELAYED RELEASE ORAL at 06:01

## 2018-08-11 RX ADMIN — INSULIN HUMAN SCH: 100 INJECTION, SOLUTION PARENTERAL at 07:30

## 2018-08-11 NOTE — CT
Date of service: 



08/11/2018



PROCEDURE:  CT HEAD WITHOUT CONTRAST.



HISTORY:

stroke evolution



COMPARISON:

Comparison made with CT scan brain and MRI both dated 08/09/2018.



TECHNIQUE:

Axial computed tomography images were obtained through the head/brain 

without intravenous contrast.  



Radiation dose:



Total exam DLP = 801.47 mGy-cm.



This CT exam was performed using one or more of the following dose 

reduction techniques: Automated exposure control, adjustment of the 

mA and/or kV according to patient size, and/or use of iterative 

reconstruction technique.



FINDINGS:



HEMORRHAGE:

No acute parenchymal, subarachnoid or extra-axial hemorrhage. 

Hemorrhage. 



BRAIN:

No evidence of large acute infarct. Minimal chronic white matter 

ischemic changes less well seen on this exam as compared to prior MRI.



Ventricular and sulcal size within range of normal this patient's 

stated age.



VENTRICLES:

No obstructive hydrocephalus. 



CALVARIUM:

Unremarkable.



PARANASAL SINUSES:

Unremarkable as visualized. No significant inflammatory changes.



MASTOID AIR CELLS:

Unremarkable as visualized. No inflammatory changes.



OTHER FINDINGS:

None.



IMPRESSION:

No acute intracranial hemorrhage. Minimal chronic white matter 

ischemic changes less well seen on this study compared prior MRI of 

the brain as detailed above.

## 2018-08-12 LAB
ALBUMIN SERPL-MCNC: 3.8 G/DL (ref 3–4.8)
ALBUMIN/GLOB SERPL: 1.3 {RATIO} (ref 1.1–1.8)
ALT SERPL-CCNC: 28 U/L (ref 7–56)
AST SERPL-CCNC: 32 U/L (ref 14–36)
BASOPHILS # BLD AUTO: 0.02 K/MM3 (ref 0–2)
BASOPHILS NFR BLD: 0.3 % (ref 0–3)
BUN SERPL-MCNC: 10 MG/DL (ref 7–21)
CALCIUM SERPL-MCNC: 9 MG/DL (ref 8.4–10.5)
EOSINOPHIL # BLD: 0.2 10*3/UL (ref 0–0.7)
EOSINOPHIL NFR BLD: 2.8 % (ref 1.5–5)
ERYTHROCYTE [DISTWIDTH] IN BLOOD BY AUTOMATED COUNT: 15.5 % (ref 11.5–14.5)
GFR NON-AFRICAN AMERICAN: > 60
GRANULOCYTES # BLD: 3.28 10*3/UL (ref 1.4–6.5)
GRANULOCYTES NFR BLD: 54.5 % (ref 50–68)
HGB BLD-MCNC: 12 G/DL (ref 12–16)
LYMPHOCYTES # BLD: 1.9 10*3/UL (ref 1.2–3.4)
LYMPHOCYTES NFR BLD AUTO: 31.6 % (ref 22–35)
MCH RBC QN AUTO: 24.8 PG (ref 25–35)
MCHC RBC AUTO-ENTMCNC: 32.4 G/DL (ref 31–37)
MCV RBC AUTO: 76.4 FL (ref 80–105)
MONOCYTES # BLD AUTO: 0.7 10*3/UL (ref 0.1–0.6)
MONOCYTES NFR BLD: 10.8 % (ref 1–6)
PLATELET # BLD: 319 10^3/UL (ref 120–450)
PMV BLD AUTO: 9.6 FL (ref 7–11)
RBC # BLD AUTO: 4.84 10^6/UL (ref 3.5–6.1)
WBC # BLD AUTO: 6 10^3/UL (ref 4.5–11)

## 2018-08-12 RX ADMIN — INSULIN HUMAN SCH: 100 INJECTION, SOLUTION PARENTERAL at 18:09

## 2018-08-12 RX ADMIN — INSULIN HUMAN SCH: 100 INJECTION, SOLUTION PARENTERAL at 07:30

## 2018-08-12 RX ADMIN — PANTOPRAZOLE SODIUM SCH MG: 40 TABLET, DELAYED RELEASE ORAL at 05:49

## 2018-08-12 RX ADMIN — VITAMIN D, TAB 1000IU (100/BT) SCH INTLU: 25 TAB at 10:09

## 2018-08-12 RX ADMIN — INSULIN HUMAN SCH: 100 INJECTION, SOLUTION PARENTERAL at 11:30

## 2018-08-12 RX ADMIN — INSULIN HUMAN SCH: 100 INJECTION, SOLUTION PARENTERAL at 22:51

## 2018-08-12 NOTE — CP.PCM.PN
<Gilles Gilmore - Last Filed: 08/12/18 11:27>





Subjective





- Date & Time of Evaluation


Date of Evaluation: 08/12/18


Time of Evaluation: 08:35





- Subjective


Subjective: 


Gilles Gilmore DO PGY1 Internal Medicine Intern - Hospital Progress Note


Pt. notified of CT scan results from yesterday.


Reports no issue overnight


Still having complaints of R leg pain/ weakness. Patient notified importance of 

PT.


Dysarthria has resolved at evaluation today. 





12 system ROS otherwise negative at this time








Objective





- Vital Signs/Intake and Output


Vital Signs (last 24 hours): 


 











Temp Pulse Resp BP Pulse Ox


 


 98.4 F   59 L  19   98/53 L  93 L


 


 08/12/18 04:00  08/12/18 06:00  08/12/18 04:00  08/12/18 04:00  08/12/18 04:00








Intake and Output: 


 











 08/12/18 08/12/18





 06:59 18:59


 


Intake Total 250 


 


Output Total 900 


 


Balance -650 














- Medications


Medications: 


 Current Medications





Acetaminophen (Tylenol 325mg Tab)  650 mg PO Q4H PRN


   PRN Reason: headache and pain


   Last Admin: 08/11/18 05:58 Dose:  650 mg


Albuterol/Ipratropium (Duoneb 3 Mg/0.5 Mg (3 Ml) Ud)  3 ml IH Q2H PRN


   PRN Reason: Shortness of Breath


Aspirin (Aspirin Chewable)  81 mg PO DAILY [facility]


   Last Admin: 08/11/18 10:12 Dose:  81 mg


Atorvastatin Calcium (Lipitor)  40 mg PO DAILY [facility]


   Last Admin: 08/11/18 10:12 Dose:  40 mg


Carvedilol (Coreg)  6.25 mg PO BID [facility]


   Last Admin: 08/11/18 18:20 Dose:  Not Given


Cholecalciferol (Vitamin D)  1,000 intlu PO DAILY [facility]


   Last Admin: 08/11/18 10:13 Dose:  1,000 intlu


Clopidogrel Bisulfate (Plavix)  75 mg PO DAILY [facility]


   Last Admin: 08/11/18 10:13 Dose:  75 mg


Insulin Human Regular (Humulin R Low)  0 units SC ACHS [facility]


   PRN Reason: Protocol


   Last Admin: 08/12/18 07:30 Dose:  Not Given


Levetiracetam (Keppra)  500 mg PO BID [facility]


   Last Admin: 08/11/18 18:20 Dose:  500 mg


Losartan Potassium (Cozaar)  100 mg PO DAILY [facility]


   Last Admin: 08/11/18 11:01 Dose:  100 mg


Meclizine HCl (Antivert)  25 mg PO TID [facility]


   Last Admin: 08/11/18 18:20 Dose:  25 mg


Pantoprazole Sodium (Protonix Ec Tab)  40 mg PO 0600 [facility]


   Last Admin: 08/12/18 05:49 Dose:  40 mg


Vitamin A (Vitamin A & D Oint Ud Foilpak)  1 ea TOP Q2 PRN


   PRN Reason: Dry mouth











- Labs


Labs: 


 





 08/12/18 05:10 





 08/12/18 05:10 





 











PT  11.4 SECONDS (9.4-12.5)   08/08/18  14:30    


 


INR  1.00   08/08/18  14:30    


 


APTT  32.5 Seconds (25.1-36.5)   08/08/18  14:30    








Physical Exam





- Constitutional


Appears: Non-toxic, No Acute Distress





- Head Exam


Head Exam: ATRAUMATIC, NORMOCEPHALIC


Additional comments: 


Right sided facial droop





- Eye Exam


Eye Exam: EOMI, Normal appearance, PERRL.  absent: Conjunctival injection, 

Nystagmus, Periorbital swelling, Periorbital tenderness, Scleral icterus


Pupil Exam: NORMAL ACCOMODATION, PERRL.  absent: Fixed, Irregular, Miosis, 

Mydriatic, Unequal





- ENT Exam


ENT Exam: Mucous Membranes Moist, Normal External Ear Exam.  absent: Mucous 

Membranes Dry, Normal Oropharynx





- Neck Exam


Neck exam: Positive for: Full Rom, Normal Inspection.  Negative for: 

Lymphadenopathy, Meningismus, Tenderness, Thyromegaly





- Respiratory Exam


Respiratory Exam: Clear to Auscultation Bilateral, NORMAL BREATHING PATTERN.  

absent: Accessory Muscle Use, Chest Wall Tenderness, Decreased Breath Sounds, 

Prolonged Expiratory Phase, Rales, Rhonchi, Wheezes, Respiratory Distress, 

Stridor





- Cardiovascular Exam


Cardiovascular Exam: REGULAR RHYTHM, RRR, +S1, +S2.  absent: Bradycardia, 

Tachycardia, Clicks, Diastolic murmur, Gallop, Irregular Rhythm, JVD, Rubs, +S4

, Systolic Murmur





- GI/Abdominal Exam


GI & Abdominal Exam: Normal Bowel Sounds, Soft.  absent: Bruit, Diminished 

Bowel Sounds, Distended, Firm, Guarding, Hernia, Hyperactive Bowel Sounds, 

Hypoactive Bowel Sounds, Mass, Organomegaly, Pulsatile Mass, Rebound, Rigid, 

Tenderness





- Extremities Exam


Extremities exam: Decreased ROM in RLE, but could bring leg past midline. 

Difficulty lifting. Positive for: normal capillary refill, pedal pulses 

present.  Negative for: calf tenderness, joint swelling, normal inspection, 

pedal edema, tenderness





- Back Exam


Back exam: FULL ROM, NORMAL INSPECTION.  absent: CVA tenderness (L), CVA 

tenderness (R), muscle spasm, paraspinal tenderness, rash noted, tenderness, 

vertebral tenderness





- Neurological Exam


Neurological exam: Alert, Oriented x3





- Expanded Neurological Exam


  ** Expanded


Patient oriented to: person, place, time, situation


Speech: Dyarthria absent on exam today


Cranial nerves: Facial Palsy w/Forehead Movement: Abnormal Right


Neuro motor strength exam: Left Upper Extremity: 4, Right Upper Extremity: 4, 

Left Lower Extremity: 4, Right Lower Extremity: 2


Sensation intact in UE and LE bilaterally





- Psychiatric Exam


Psychiatric exam: Normal Affect, Normal Mood





- Skin


Skin Exam: Dry, Intact, Normal Color, Warm





Assessment and Plan





- Assessment and Plan (Free Text)


Assessment: 





52 year old female with a past medical history significant for previous CVA 

with residual RLE weakness, seizures, DM2, HTN, TRISTIN, asthma, and vertigo who 

presented with right sided weakness, dysarthria and facial drooping that 

started 8/8 during an EEG.





Plan: 


Dysarthria vs TIA


- Dysarthria is resolved at this time


- Repeat CT on 8/11 showed no progression or conversion to hemorrhagic stroke


- Initial CT Head showed no intracranial abnormalities


- CXR showed no active disease


- Given tPA in ED 8/8


- MRA Neck normal


- Brain MRI showed no acute abnormality. Mild chronic microangiopathic changes 

along with mild cerebellar tonsillar ectopia


- Repeat Head CT showed no evidence of acute infarct


- Speech therapy: mild oral pharyngeal dysphagia w low to mod risk aspiration 

due to min oral motor weakness


- Passed Swallow Study and recommended mechanical soft bite size pieces


- continued home PO meds along with dual anti-platelet therapy 


- NS @ 100 cc/hr


- Fall, bleed and Aspiration precautions


- Transfer to telemetry


- Neurology and Cardiology consulted, all recommendations appreciated


- f/u Echo w bubble study to rule out PFO due to recurrent CVAs per cardiology (

Dr. Eller)





History of Seizures


- Keppra 500mg PO BID


- Nonepileptic seizure observed yesterday; will continue monitoring 


- Seizure precautions


- Will continue to monitor





Hypokalemia


Repleted today


Recheck in AM 





History of DM2


- ISS-Low and Accuchecks ACHS


- A1c 5.8


- lipid panel wnl





History of Asthma


-Duonebs Q2 PRN


- will monitor





Hx of HTN


- Patient has with minimal hypotension today


-DC'd HCTZ, and Amlodipine


-Holding parameters on Cozaaar 100 QD 


-Holding parameters on Coreg 6.25 BID 


-Will monitor





GI Prophylaxis: Protonix 40 PO


DVT Prophylaxis: SCD's 


Disposition: F/u PT recommendations regarding rehab and placement 





Patient seen, case reviewed, and plan discussed with Dr. Cummings.


Gilles Gilmore DO - PGY1 Internal Medicine Intern - Pager [phone]





<Shannon Cummings - Last Filed: 08/12/18 12:04>





Objective





- Vital Signs/Intake and Output


Vital Signs (last 24 hours): 


 











Temp Pulse Resp BP Pulse Ox


 


 98.4 F   68   19   117/79   93 L


 


 08/12/18 04:00  08/12/18 10:09  08/12/18 04:00  08/12/18 10:09  08/12/18 04:00








Intake and Output: 


 











 08/12/18 08/12/18





 06:59 18:59


 


Intake Total 250 


 


Output Total 900 


 


Balance -650 














- Medications


Medications: 


 Current Medications





Acetaminophen (Tylenol 325mg Tab)  650 mg PO Q4H PRN


   PRN Reason: headache and pain


   Last Admin: 08/11/18 05:58 Dose:  650 mg


Albuterol/Ipratropium (Duoneb 3 Mg/0.5 Mg (3 Ml) Ud)  3 ml IH Q2H PRN


   PRN Reason: Shortness of Breath


Aspirin (Aspirin Chewable)  81 mg PO DAILY [facility]


   Last Admin: 08/12/18 10:10 Dose:  81 mg


Atorvastatin Calcium (Lipitor)  40 mg PO DAILY [facility]


   Last Admin: 08/12/18 10:10 Dose:  40 mg


Carvedilol (Coreg)  6.25 mg PO BID [facility]


Cholecalciferol (Vitamin D)  1,000 intlu PO DAILY [facility]


   Last Admin: 08/12/18 10:09 Dose:  1,000 intlu


Clopidogrel Bisulfate (Plavix)  75 mg PO DAILY [facility]


   Last Admin: 08/12/18 10:09 Dose:  75 mg


Insulin Human Regular (Humulin R Low)  0 units SC ACHS [facility]


   PRN Reason: Protocol


   Last Admin: 08/12/18 07:30 Dose:  Not Given


Levetiracetam (Keppra)  500 mg PO BID [facility]


   Last Admin: 08/12/18 10:10 Dose:  500 mg


Losartan Potassium (Cozaar)  100 mg PO DAILY [facility]


Meclizine HCl (Antivert)  25 mg PO TID [facility]


   Last Admin: 08/11/18 18:20 Dose:  25 mg


Pantoprazole Sodium (Protonix Ec Tab)  40 mg PO 0600 [facility]


   Last Admin: 08/12/18 05:49 Dose:  40 mg


Vitamin A (Vitamin A & D Oint Ud Foilpak)  1 ea TOP Q2 PRN


   PRN Reason: Dry mouth











- Labs


Labs: 


 





 08/12/18 05:10 





 08/12/18 05:10 





 











PT  11.4 SECONDS (9.4-12.5)   08/08/18  14:30    


 


INR  1.00   08/08/18  14:30    


 


APTT  32.5 Seconds (25.1-36.5)   08/08/18  14:30    














Attending/Attestation





- Attestation


I have personally seen and examined this patient.: Yes


I have fully participated in the care of the patient.: Yes


I have reviewed all pertinent clinical information, including history, physical 

exam and plan: Yes


Notes (Text): 





08/12/18 12:02








Medical record note made by the resident after discussion with my direction and 

input after the patient was personally seen and examined by me. I have reviewed 

the chart and agree that the record accurately reflects by personal performance 

of the history, physical exam, data review, and medical decision-making, in the 

course for the patient. I have also personally directed the plan of care.





52 Yrs  Female with PMH of significant for previous CVA with residual Right 

Lower extremity weakness, seizure disorder, DM2, HTN, TRISTIN, asthma, and vertigo 

was admitted  with worsening of right lower extremity weakness and  dysarthria  

, patient was in her Neurologist office at that time. CT head was negative. 

Patient was given TPA, Repeat CT can of head and MRI of brain today is negative 

for acute infarct.MRA of neck is unremarkable..





Dysarthia is resolved. Right lower extremity weakness is persistent. Echo was 

reviewed. There is no PFO or cardiac thrombus. Patient has been restarted on 

her Aspirin and Plavix.





Patient blood pressure was running low, HCTZ and amlodipine is discontinued.We 

will monitor and adjust medication. 





Management plan was discussed in detail with patient. Education was provided

## 2018-08-13 VITALS — HEART RATE: 86 BPM | DIASTOLIC BLOOD PRESSURE: 71 MMHG | SYSTOLIC BLOOD PRESSURE: 107 MMHG | TEMPERATURE: 99 F

## 2018-08-13 VITALS — OXYGEN SATURATION: 98 % | RESPIRATION RATE: 20 BRPM

## 2018-08-13 LAB
ALBUMIN SERPL-MCNC: 3.8 G/DL (ref 3–4.8)
ALBUMIN/GLOB SERPL: 1.3 {RATIO} (ref 1.1–1.8)
ALT SERPL-CCNC: 32 U/L (ref 7–56)
AST SERPL-CCNC: 27 U/L (ref 14–36)
BASOPHILS # BLD AUTO: 0.03 K/MM3 (ref 0–2)
BASOPHILS NFR BLD: 0.5 % (ref 0–3)
BUN SERPL-MCNC: 14 MG/DL (ref 7–21)
CALCIUM SERPL-MCNC: 8.8 MG/DL (ref 8.4–10.5)
EOSINOPHIL # BLD: 0.1 10*3/UL (ref 0–0.7)
EOSINOPHIL NFR BLD: 2.2 % (ref 1.5–5)
ERYTHROCYTE [DISTWIDTH] IN BLOOD BY AUTOMATED COUNT: 15.6 % (ref 11.5–14.5)
GFR NON-AFRICAN AMERICAN: > 60
GRANULOCYTES # BLD: 3.28 10*3/UL (ref 1.4–6.5)
GRANULOCYTES NFR BLD: 54.3 % (ref 50–68)
HGB BLD-MCNC: 11.8 G/DL (ref 12–16)
LYMPHOCYTES # BLD: 2 10*3/UL (ref 1.2–3.4)
LYMPHOCYTES NFR BLD AUTO: 33.6 % (ref 22–35)
MCH RBC QN AUTO: 24.8 PG (ref 25–35)
MCHC RBC AUTO-ENTMCNC: 32.2 G/DL (ref 31–37)
MCV RBC AUTO: 76.9 FL (ref 80–105)
MONOCYTES # BLD AUTO: 0.6 10*3/UL (ref 0.1–0.6)
MONOCYTES NFR BLD: 9.4 % (ref 1–6)
PLATELET # BLD: 323 10^3/UL (ref 120–450)
PMV BLD AUTO: 9.6 FL (ref 7–11)
RBC # BLD AUTO: 4.76 10^6/UL (ref 3.5–6.1)
WBC # BLD AUTO: 6 10^3/UL (ref 4.5–11)

## 2018-08-13 RX ADMIN — INSULIN HUMAN SCH: 100 INJECTION, SOLUTION PARENTERAL at 09:09

## 2018-08-13 RX ADMIN — VITAMIN D, TAB 1000IU (100/BT) SCH INTLU: 25 TAB at 09:18

## 2018-08-13 RX ADMIN — PANTOPRAZOLE SODIUM SCH MG: 40 TABLET, DELAYED RELEASE ORAL at 05:29

## 2018-08-13 NOTE — CP.PCM.DIS
<Fahad Baez - Last Filed: 08/14/18 11:13>





Provider





- Provider


Date of Admission: 


08/08/18 16:38





Attending physician: 


Nicki Cohen MD





Time Spent in preparation of Discharge (in minutes): 45





Diagnosis





- Discharge Diagnosis


(1) CVA (cerebral vascular accident)


Status: Resolved   Priority: Medium   





(2) Dysarthria


Status: Resolved   Priority: Medium   





(3) Seizure


Status: Chronic   Priority: Medium   





(4) Hypokalemia


Status: Resolved   Priority: Low   





(5) Diabetes mellitus


Status: Chronic   Priority: Medium   





(6) Asthma


Status: Chronic   Priority: Medium   





(7) HTN (hypertension)


Status: Chronic   Priority: Medium   





Hospital Course





- Lab Results


Lab Results: 


 Micro Results





08/08/18 18:45   Nose   MRSA Culture (Admit) - Final


                            MRSA NOT DETECTED





 Most Recent Lab Values











WBC  6.0 10^3/ul (4.5-11.0)   08/13/18  06:00    


 


RBC  4.76 10^6/uL (3.5-6.1)   08/13/18  06:00    


 


Hgb  11.8 g/dL (12.0-16.0)  L  08/13/18  06:00    


 


Hct  36.6 % (36.0-48.0)   08/13/18  06:00    


 


MCV  76.9 fl (80.0-105.0)  L  08/13/18  06:00    


 


MCH  24.8 pg (25.0-35.0)  L  08/13/18  06:00    


 


MCHC  32.2 g/dl (31.0-37.0)   08/13/18  06:00    


 


RDW  15.6 % (11.5-14.5)  H  08/13/18  06:00    


 


Plt Count  323 10^3/uL (120.0-450.0)   08/13/18  06:00    


 


MPV  9.6 fl (7.0-11.0)   08/13/18  06:00    


 


Gran %  54.3 % (50.0-68.0)   08/13/18  06:00    


 


Lymph % (Auto)  33.6 % (22.0-35.0)   08/13/18  06:00    


 


Mono % (Auto)  9.4 % (1.0-6.0)  H  08/13/18  06:00    


 


Eos % (Auto)  2.2 % (1.5-5.0)   08/13/18  06:00    


 


Baso % (Auto)  0.5 % (0.0-3.0)   08/13/18  06:00    


 


Gran #  3.28  (1.4-6.5)   08/13/18  06:00    


 


Lymph # (Auto)  2.0  (1.2-3.4)   08/13/18  06:00    


 


Mono # (Auto)  0.6  (0.1-0.6)   08/13/18  06:00    


 


Eos # (Auto)  0.1  (0.0-0.7)   08/13/18  06:00    


 


Baso # (Auto)  0.03 K/mm3 (0.0-2.0)   08/13/18  06:00    


 


PT  11.4 SECONDS (9.4-12.5)   08/08/18  14:30    


 


INR  1.00   08/08/18  14:30    


 


APTT  32.5 Seconds (25.1-36.5)   08/08/18  14:30    


 


Sodium  146 mmol/L (132-148)   08/13/18  06:00    


 


Potassium  3.8 mmol/L (3.6-5.0)   08/13/18  06:00    


 


Chloride  107 mmol/L ()   08/13/18  06:00    


 


Carbon Dioxide  28 mmol/L (21-33)   08/13/18  06:00    


 


Anion Gap  15  (10-20)   08/13/18  06:00    


 


BUN  14 mg/dL (7-21)   08/13/18  06:00    


 


Creatinine  0.7 mg/dl (0.7-1.2)   08/13/18  06:00    


 


Est GFR ( Amer)  > 60   08/13/18  06:00    


 


Est GFR (Non-Af Amer)  > 60   08/13/18  06:00    


 


POC Glucose (mg/dL)  88 mg/dL ()   08/13/18  07:49    


 


Random Glucose  99 mg/dL ()   08/13/18  06:00    


 


Hemoglobin A1c  5.8 % (4.2-6.5)   08/08/18  14:30    


 


Calcium  8.8 mg/dL (8.4-10.5)   08/13/18  06:00    


 


Phosphorus  4.4 mg/dL (2.5-4.5)   08/13/18  06:00    


 


Magnesium  1.9 mg/dL (1.7-2.2)   08/11/18  05:00    


 


Total Bilirubin  0.7 mg/dL (0.2-1.3)   08/13/18  06:00    


 


AST  27 U/L (14-36)   08/13/18  06:00    


 


ALT  32 U/L (7-56)   08/13/18  06:00    


 


Alkaline Phosphatase  73 U/L ()   08/13/18  06:00    


 


Troponin I  0.01 ng/mL  08/08/18  14:30    


 


Total Protein  6.7 g/dL (5.8-8.3)   08/13/18  06:00    


 


Albumin  3.8 g/dL (3.0-4.8)   08/13/18  06:00    


 


Globulin  2.9 gm/dL  08/13/18  06:00    


 


Albumin/Globulin Ratio  1.3  (1.1-1.8)   08/13/18  06:00    


 


Triglycerides  60 mg/dL ()   08/09/18  18:14    


 


Cholesterol  109 mg/dL (130-200)  L  08/09/18  18:14    


 


LDL Cholesterol Direct  45 mg/dL (0-129)   08/09/18  18:14    


 


HDL Cholesterol  39 mg/dL (29-60)   08/09/18  18:14    


 


Free T4  0.99 ng/dL (0.78-2.19)   08/09/18  18:14    


 


TSH 3rd Generation  1.28 mIU/mL (0.46-4.68)   08/09/18  18:14    


 


Blood Type  O POSITIVE   08/08/18  14:30    


 


Blood Type Confirm  O POSITIVE   08/09/18  18:14    


 


Antibody Screen  Negative   08/08/18  14:30    


 


BBK History Checked  No verified bt   08/08/18  14:30    














- Hospital Course


Hospital Course: 


Ms. Balderas is a 52 year old female with a past medical history significant for 

previous CVA with residual RLE weakness, seizures, DM2, HTN, TRISTIN, asthma, and 

vertigo who presented with right sided weakness, dysarthria and facial drooping 

that started 8/8 during an EEG. Patient was a code stroke in the ED and tPA was 

administered. Patient was treated with aspirin, plavix, duoneb, lipitor, coreg, 

vitamin D, insulin, keppra, cozaar, antivert, and protonix. During the course 

of her hospital stay, patient got an EKG that showed normal sinus rhythm at 78 

bpm and no ischemia. Chest xray showed no acute disease. Head CT showed no 

acute intracranial hemorrhage. 2D echo showed LVEF of 60-65%. Brain MRI showed 

no acute disease. Neck MRA was normal. Neurology (Dr. Kahn) was consulted and 

concluded that despite presentation similar to CVA, MRI showed no evidence of 

an acute or early subacute infarction. Patient was instructed to resume all 

home medications as prescribed. Physical therapy treated her while in the 

hospital and recommended that she goes to a sub-acute rehab for her right foot 

drop. However, the patient refused to go to rehab and wanted to continue home 

physical therapy. Patient was instructed to use her home orthotic boot and to 

follow up with outpatient orthopedics. Patient was educated numerous times to 

go to sub-acute rehab but adamantly refused. She was also instructed to follow 

up with primary doctor (Dr. Silveira) within 3-5 days and with neurologist (Dr. Reed) within 1-2 weeks after discharge. Patient was instructed to resume 

activities as tolerated and continue with home physical therapy. Patient 

further informed to return to the ED for worsening or newly concerning symptoms.

Patient is now medically stable for discharge.








Discharge Exam





- Head Exam


Head Exam: NORMAL INSPECTION





- Eye Exam


Eye Exam: EOMI, Normal appearance, PERRL





- ENT Exam


ENT Exam: Mucous Membranes Moist





- Respiratory Exam


Respiratory Exam: Clear to PA & Lateral.  absent: Rales, Rhonchi, Wheezes





- Cardiovascular Exam


Cardiovascular Exam: REGULAR RHYTHM, +S1, +S2.  absent: Gallop, Rubs, Systolic 

Murmur





- GI/Abdominal Exam


GI & Abdominal Exam: Normal Bowel Sounds, Soft.  absent: Tenderness





- Extremities Exam


Additional comments: 





no calf tenderness of pedal edema





- Neurological Exam


Neurological exam: Alert, CN II-XII Intact, Oriented x3


Additional comments: 


muscle strength 5/5 bilateral upper extremities and left lower extremity, 4/5 

on right leg, 4/5 right plantarflexion.








- Psychiatric Exam


Psychiatric exam: Normal Affect, Normal Mood





- Skin


Skin Exam: Dry, Intact, Warm





Discharge Plan





- Follow Up Plan


Condition: FAIR


Disposition: HOME/ ROUTINE


Instructions:  Type 2 Diabetes, High Blood Pressure (DC), Stroke (DC), Right-

Side Stroke (DC), Stroke Rehab Exercises


Additional Instructions: 


1. Please resume all home medications as previously prescribed


2. Please follow up with your primary care doctor (Dr. Silveira) within one week 

of discharge


3. Please follow up with your neurologist (Dr. Reed) within 1 or 2 weeks of 

discharge


4. Continue to work with home physical therapy


5. Return to the nearest emergency room if you experience worsening or newly 

concerning symptoms


Referrals: 


Jax Silveira MD [Medical Doctor] - 





<Nicki Cohen - Last Filed: 08/14/18 12:26>





Provider





- Provider


Date of Admission: 


08/08/18 16:38





Attending physician: 


Nicki Cohen MD








Hospital Course





- Lab Results


Lab Results: 


 Micro Results





08/08/18 18:45   Nose   MRSA Culture (Admit) - Final


                            MRSA NOT DETECTED





 Most Recent Lab Values











WBC  6.0 10^3/ul (4.5-11.0)   08/13/18  06:00    


 


RBC  4.76 10^6/uL (3.5-6.1)   08/13/18  06:00    


 


Hgb  11.8 g/dL (12.0-16.0)  L  08/13/18  06:00    


 


Hct  36.6 % (36.0-48.0)   08/13/18  06:00    


 


MCV  76.9 fl (80.0-105.0)  L  08/13/18  06:00    


 


MCH  24.8 pg (25.0-35.0)  L  08/13/18  06:00    


 


MCHC  32.2 g/dl (31.0-37.0)   08/13/18  06:00    


 


RDW  15.6 % (11.5-14.5)  H  08/13/18  06:00    


 


Plt Count  323 10^3/uL (120.0-450.0)   08/13/18  06:00    


 


MPV  9.6 fl (7.0-11.0)   08/13/18  06:00    


 


Gran %  54.3 % (50.0-68.0)   08/13/18  06:00    


 


Lymph % (Auto)  33.6 % (22.0-35.0)   08/13/18  06:00    


 


Mono % (Auto)  9.4 % (1.0-6.0)  H  08/13/18  06:00    


 


Eos % (Auto)  2.2 % (1.5-5.0)   08/13/18  06:00    


 


Baso % (Auto)  0.5 % (0.0-3.0)   08/13/18  06:00    


 


Gran #  3.28  (1.4-6.5)   08/13/18  06:00    


 


Lymph # (Auto)  2.0  (1.2-3.4)   08/13/18  06:00    


 


Mono # (Auto)  0.6  (0.1-0.6)   08/13/18  06:00    


 


Eos # (Auto)  0.1  (0.0-0.7)   08/13/18  06:00    


 


Baso # (Auto)  0.03 K/mm3 (0.0-2.0)   08/13/18  06:00    


 


PT  11.4 SECONDS (9.4-12.5)   08/08/18  14:30    


 


INR  1.00   08/08/18  14:30    


 


APTT  32.5 Seconds (25.1-36.5)   08/08/18  14:30    


 


Sodium  146 mmol/L (132-148)   08/13/18  06:00    


 


Potassium  3.8 mmol/L (3.6-5.0)   08/13/18  06:00    


 


Chloride  107 mmol/L ()   08/13/18  06:00    


 


Carbon Dioxide  28 mmol/L (21-33)   08/13/18  06:00    


 


Anion Gap  15  (10-20)   08/13/18  06:00    


 


BUN  14 mg/dL (7-21)   08/13/18  06:00    


 


Creatinine  0.7 mg/dl (0.7-1.2)   08/13/18  06:00    


 


Est GFR ( Amer)  > 60   08/13/18  06:00    


 


Est GFR (Non-Af Amer)  > 60   08/13/18  06:00    


 


POC Glucose (mg/dL)  88 mg/dL ()   08/13/18  07:49    


 


Random Glucose  99 mg/dL ()   08/13/18  06:00    


 


Hemoglobin A1c  5.8 % (4.2-6.5)   08/08/18  14:30    


 


Calcium  8.8 mg/dL (8.4-10.5)   08/13/18  06:00    


 


Phosphorus  4.4 mg/dL (2.5-4.5)   08/13/18  06:00    


 


Magnesium  1.9 mg/dL (1.7-2.2)   08/11/18  05:00    


 


Total Bilirubin  0.7 mg/dL (0.2-1.3)   08/13/18  06:00    


 


AST  27 U/L (14-36)   08/13/18  06:00    


 


ALT  32 U/L (7-56)   08/13/18  06:00    


 


Alkaline Phosphatase  73 U/L ()   08/13/18  06:00    


 


Troponin I  0.01 ng/mL  08/08/18  14:30    


 


Total Protein  6.7 g/dL (5.8-8.3)   08/13/18  06:00    


 


Albumin  3.8 g/dL (3.0-4.8)   08/13/18  06:00    


 


Globulin  2.9 gm/dL  08/13/18  06:00    


 


Albumin/Globulin Ratio  1.3  (1.1-1.8)   08/13/18  06:00    


 


Triglycerides  60 mg/dL ()   08/09/18  18:14    


 


Cholesterol  109 mg/dL (130-200)  L  08/09/18  18:14    


 


LDL Cholesterol Direct  45 mg/dL (0-129)   08/09/18  18:14    


 


HDL Cholesterol  39 mg/dL (29-60)   08/09/18  18:14    


 


Free T4  0.99 ng/dL (0.78-2.19)   08/09/18  18:14    


 


TSH 3rd Generation  1.28 mIU/mL (0.46-4.68)   08/09/18  18:14    


 


Blood Type  O POSITIVE   08/08/18  14:30    


 


Blood Type Confirm  O POSITIVE   08/09/18  18:14    


 


Antibody Screen  Negative   08/08/18  14:30    


 


BBK History Checked  No verified bt   08/08/18  14:30    














Attending/Attestation





- Attestation


I have personally seen and examined this patient.: Yes


I have fully participated in the care of the patient.: Yes


I have reviewed all pertinent clinical information, including history, physical 

exam and plan: Yes


Notes (Text): 





08/13/18


52 year old female with past medical history of CVA with residual right lower 

extremity weakness, seizure disorder, diabetes and hypertension who presented 

with complaint of worsening right lower extremity weakness and dysarthria.  CT 

head was negative.  She was given TPA.  Repeat CT head and MRI brain were 

negative for acute infarct.  MRI neck was unremarkable.  She was seen by 

neurology.  Dysarthria resolved and right leg weakness is baseline as per 

patient.  She was seen by PT who recommended MALACHI, however patient refused MALACHI 

preferring to go home with services since she is at her baseline.  While in 

hospital her bp was running low normal so her HCTZ and norvasc were held.  She 

is on aspirin, plavix and statin.  She is on keppra for seizure history.





Patient is discharged home with home services.


Follow up with pmd and neurologist.


Hold HCTZ and norvasc for now and monitor BP at home and with pmd.





Nicki Cohen MD


Hospitalist.

## 2018-08-13 NOTE — CP.PCM.PN
Subjective





- Date & Time of Evaluation


Date of Evaluation: 08/13/18


Time of Evaluation: 06:25





- Subjective


Subjective: 





Awake, alert, no distress





Reason for consultation and follow up:Cardiac evaluation of new CVA post TPA, 

History of CVA with residual right leg weakness, seizures, hypertension, asthma

, vertigo





Seen and examined by me and Dr. Eller

















Objective





- Vital Signs/Intake and Output


Vital Signs (last 24 hours): 


 











Temp Pulse Resp BP Pulse Ox


 


 97.8 F   71   20   104/62   98 


 


 08/13/18 06:00  08/13/18 06:00  08/13/18 06:00  08/13/18 06:00  08/13/18 06:00








Intake and Output: 


 











 08/13/18 08/13/18





 06:59 18:59


 


Intake Total 1020 


 


Output Total 751 


 


Balance 269 














- Medications


Medications: 


 Current Medications





Acetaminophen (Tylenol 325mg Tab)  650 mg PO Q4H PRN


   PRN Reason: headache and pain


   Last Admin: 08/11/18 05:58 Dose:  650 mg


Albuterol/Ipratropium (Duoneb 3 Mg/0.5 Mg (3 Ml) Ud)  3 ml IH Q2H PRN


   PRN Reason: Shortness of Breath


Aspirin (Aspirin Chewable)  81 mg PO DAILY Counts include 234 beds at the [facility]


   Last Admin: 08/12/18 10:10 Dose:  81 mg


Atorvastatin Calcium (Lipitor)  40 mg PO DAILY Counts include 234 beds at the [facility]


   Last Admin: 08/12/18 10:10 Dose:  40 mg


Carvedilol (Coreg)  6.25 mg PO BID Counts include 234 beds at the [facility]


   Last Admin: 08/12/18 18:08 Dose:  Not Given


Cholecalciferol (Vitamin D)  1,000 intlu PO DAILY Counts include 234 beds at the [facility]


   Last Admin: 08/12/18 10:09 Dose:  1,000 intlu


Clopidogrel Bisulfate (Plavix)  75 mg PO DAILY Counts include 234 beds at the [facility]


   Last Admin: 08/12/18 10:09 Dose:  75 mg


Insulin Human Regular (Humulin R Low)  0 units SC ACHS Counts include 234 beds at the [facility]


   PRN Reason: Protocol


   Last Admin: 08/12/18 22:51 Dose:  Not Given


Levetiracetam (Keppra)  500 mg PO BID Counts include 234 beds at the [facility]


   Last Admin: 08/12/18 18:12 Dose:  500 mg


Losartan Potassium (Cozaar)  100 mg PO DAILY Counts include 234 beds at the [facility]


Meclizine HCl (Antivert)  25 mg PO TID Counts include 234 beds at the [facility]


   Last Admin: 08/12/18 18:12 Dose:  25 mg


Pantoprazole Sodium (Protonix Ec Tab)  40 mg PO 0600 [facility]


   Last Admin: 08/13/18 05:29 Dose:  40 mg


Vitamin A (Vitamin A & D Oint Ud Foilpak)  1 ea TOP Q2 PRN


   PRN Reason: Dry mouth











- Labs


Labs: 


 





 08/13/18 06:00 





 08/13/18 06:00 





 











PT  11.4 SECONDS (9.4-12.5)   08/08/18  14:30    


 


INR  1.00   08/08/18  14:30    


 


APTT  32.5 Seconds (25.1-36.5)   08/08/18  14:30    














- Constitutional


Appears: No Acute Distress





- Eye Exam


Eye Exam: Normal appearance





- Respiratory Exam


Respiratory Exam: Decreased Breath Sounds, NORMAL BREATHING PATTERN





- Cardiovascular Exam


Cardiovascular Exam: REGULAR RHYTHM, +S1, +S2





- GI/Abdominal Exam


GI & Abdominal Exam: Soft, Normal Bowel Sounds





- Extremities Exam


Extremities Exam: Normal Capillary Refill


Additional comments: 





residual right leg weakness





- Neurological Exam


Neurological Exam: Alert, Awake, Oriented x3





- Psychiatric Exam


Psychiatric exam: Normal Affect





- Skin


Skin Exam: Dry, Intact, Warm





Assessment and Plan





- Assessment and Plan (Free Text)


Assessment: 





A 52 year old female who came in to the ER due to right sided weakness,TPA was 

given. History of previous CVA with residual right leg weakness, seizures,

diabetes,hypertension, obstructive sleep apnea,vertigo, hypertension, 

hyperlipidemia.she was admitted to ICU ,now in telemetry.








Plan: 





Cardiac status stable


Controlled heart rate and blood pressure


Telemetry NSR- 70's


Denies complaints, 


On ASA 81 mg daily,Lipitor 40 mg daily, Coreg 6.25 mg BID,Plavix 75 mg daily


Keppra 500 mg BID, antivert 25 mg TID


Neuro on consult


Continue current treatment


Continue current medications


Physical therapy


Aspiration precaution


Fall precaution





Will follow up





Plan and treatment discussed with Dr. Eller

## 2019-04-08 ENCOUNTER — HOSPITAL ENCOUNTER (OUTPATIENT)
Dept: HOSPITAL 31 - C.5S | Age: 54
Setting detail: OBSERVATION
LOS: 1 days | Discharge: HOME | End: 2019-04-09
Attending: INTERNAL MEDICINE | Admitting: INTERNAL MEDICINE
Payer: COMMERCIAL

## 2019-04-08 VITALS — BODY MASS INDEX: 46.3 KG/M2

## 2019-04-08 DIAGNOSIS — I10: ICD-10-CM

## 2019-04-08 DIAGNOSIS — J44.9: ICD-10-CM

## 2019-04-08 DIAGNOSIS — E11.9: ICD-10-CM

## 2019-04-08 DIAGNOSIS — E78.00: ICD-10-CM

## 2019-04-08 DIAGNOSIS — I69.341: ICD-10-CM

## 2019-04-08 DIAGNOSIS — G40.209: Primary | ICD-10-CM

## 2019-04-08 DIAGNOSIS — G47.33: ICD-10-CM

## 2019-04-08 LAB
HDLC SERPL-MCNC: 53 MG/DL (ref 30–70)
LDLC SERPL-MCNC: 48 MG/DL (ref 0–129)

## 2019-04-08 PROCEDURE — 97161 PT EVAL LOW COMPLEX 20 MIN: CPT

## 2019-04-08 PROCEDURE — 36415 COLL VENOUS BLD VENIPUNCTURE: CPT

## 2019-04-08 PROCEDURE — 80061 LIPID PANEL: CPT

## 2019-04-08 PROCEDURE — 83036 HEMOGLOBIN GLYCOSYLATED A1C: CPT

## 2019-04-08 PROCEDURE — 80053 COMPREHEN METABOLIC PANEL: CPT

## 2019-04-08 PROCEDURE — 82948 REAGENT STRIP/BLOOD GLUCOSE: CPT

## 2019-04-08 PROCEDURE — 84100 ASSAY OF PHOSPHORUS: CPT

## 2019-04-08 PROCEDURE — 97116 GAIT TRAINING THERAPY: CPT

## 2019-04-08 PROCEDURE — 80177 DRUG SCRN QUAN LEVETIRACETAM: CPT

## 2019-04-08 PROCEDURE — 85730 THROMBOPLASTIN TIME PARTIAL: CPT

## 2019-04-08 PROCEDURE — 85610 PROTHROMBIN TIME: CPT

## 2019-04-08 PROCEDURE — 83735 ASSAY OF MAGNESIUM: CPT

## 2019-04-08 PROCEDURE — 85025 COMPLETE CBC W/AUTO DIFF WBC: CPT

## 2019-04-08 RX ADMIN — HUMAN INSULIN SCH: 100 INJECTION, SOLUTION SUBCUTANEOUS at 21:38

## 2019-04-08 RX ADMIN — RANOLAZINE SCH MG: 500 TABLET, FILM COATED, EXTENDED RELEASE ORAL at 18:20

## 2019-04-08 RX ADMIN — HUMAN INSULIN SCH: 100 INJECTION, SOLUTION SUBCUTANEOUS at 20:38

## 2019-04-08 NOTE — CP.PCM.HP
History of Present Illness





- History of Present Illness


History of Present Illness: 





MEDICINE- DR THOMPSON SERVICE





53F pmhx of CVAx2 last one in 8/2018 (received tPA) with residual RLE weakness, 

seizures, DM2, HTN, MAR, asthma, and vertigo admitted to medicine service for a 

48hr EEG.  Pt sent in from Neurologist office Dr Mckenzie. Reports her seizuires 

began 9in july '18, says she gets aura and jittery, pt has inbconsitent syncope-

described as generalized tonic clonic last less than 3 min but remains aware of 

her experience. Reports fatigue and headache for 5 min after a seizure. Pt says 

her sister told her she bites her tongue sometimes during seizures, reports only

one episode of bladder incontince during a seizure sometime last year. Pt has 

been following up in clinic with Dr Mckenzie, and today was told to come in for a 

48hr video.





ROS:   Pos+ hx of seizures, hx of CVA, 


   Neg- CP , SOB ,FC, NV, current seizures, numbness, tingling, weakness, recent

sickness





PMH: CVAx2, seizures, DM2, HTN, MAR, asthma, and vertigo


PSH: denies


Family History: Non-Contributory


Social History: Denies any tobacco, alcohol or illicit drug use


Allergies: Penicillin


Home Medications: As per MAR





Present on Admission





- Present on Admission


Any Indicators Present on Admission: No





Review of Systems





- Review of Systems


All systems: reviewed and no additional remarkable complaints except (as per 

HPI)





Past Patient History





- Infectious Disease


Hx of Infectious Diseases: None





- Past Medical History & Family History


Past Medical History?: Yes





- Past Social History


Smoking Status: Never Smoked





- CARDIAC


Hx Hypercholesterolemia: Yes


Hx Hypertension: Yes





- PULMONARY


Hx Chronic Obstructive Pulmonary Disease (COPD): Yes (with residual RLE 

weakness)





- NEUROLOGICAL


Hx Dizziness: No





- HEENT


Hx HEENT Problems: No





- RENAL


Hx Chronic Kidney Disease: No





- ENDOCRINE/METABOLIC


Hx Diabetes Mellitus Type 2: Yes





- HEMATOLOGICAL/ONCOLOGICAL


Hx Blood Disorders: Yes





- INTEGUMENTARY


Hx Dermatological Problems: No





- MUSCULOSKELETAL/RHEUMATOLOGICAL


Hx Unsteady Gait: No





- GASTROINTESTINAL


Hx Gastrointestinal Disorders: Yes





- GENITOURINARY/GYNECOLOGICAL


Hx Genitourinary Disorders: No





- PSYCHIATRIC


Hx Psychophysiologic Disorder: No


Hx Substance Use: No





- SURGICAL HISTORY


Hx Surgeries: Yes





- ANESTHESIA


Hx Anesthesia: Yes


Hx Anesthesia Reactions: No


Hx Malignant Hyperthermia: No





Meds


Allergies/Adverse Reactions: 


                                    Allergies











Allergy/AdvReac Type Severity Reaction Status Date / Time


 


Penicillins Allergy Intermediate RASH Verified 01/02/18 07:01














Physical Exam





- Constitutional


Appears: Well, Non-toxic, No Acute Distress





- Head Exam


Head Exam: ATRAUMATIC, NORMAL INSPECTION





- Eye Exam


Eye Exam: EOMI, Normal appearance, PERRL


Pupil Exam: NORMAL ACCOMODATION, PERRL





- ENT Exam


ENT Exam: Mucous Membranes Moist





- Respiratory Exam


Respiratory Exam: Clear to Auscultation Bilateral.  absent: Rales, Rhonchi, 

Wheezes





- Cardiovascular Exam


Cardiovascular Exam: RRR, +S1, +S2





- GI/Abdominal Exam


GI & Abdominal Exam: Normal Bowel Sounds.  absent: Rigid, Tenderness





- Extremities Exam


Extremities exam: Positive for: pedal pulses present





- Back Exam


Back exam: NORMAL INSPECTION





- Neurological Exam


Neurological exam: Alert, CN II-XII Intact, Oriented x3, Reflexes Normal


Additional comments: 





no nystagmus noted,  no facial droop, no weakness





- Psychiatric Exam


Psychiatric exam: Normal Affect, Normal Mood





- Skin


Skin Exam: Normal Color, Warm





Assessment & Plan





- Assessment and Plan (Free Text)


Assessment: 





53F pmhx of CVAs and Seizures admitted for 48hr video EEG


Plan: 





History of Seizures


- Keppra 1500mg PO BID


- Trileptal 300mg PO BID


- 48hr EEG


- Seizure precautions


- Will continue to monitor


- Neuro Dr Mckenzie consulted: f/u recs





History of CVAs


- ASA 81 daily


- Plavix 75 daily


- Crestor 10mg HS


- Neurology Dr Mckenzie consulted


- Flexeril 5mg daily PRN for myalgias 





History of DM2


- ISS-Low and Accuchecks ACHS


- Metformin 500 BID


- f/u Lipid panel and A1C





History of Asthma


-Duonebs Q6 PRN


- will monitor





Hx of HTN


- Coreg 6.125 BID


- norvasc 5 daily





CK pgy1

## 2019-04-08 NOTE — CP.PCM.CON
History of Present Illness





- History of Present Illness


History of Present Illness: 





Neurology consult for Miss mcbride, 


   Miss Mcbride is a patient of mine from clinic who is here for video eeg 

admission as she was having increase in seizures at home. Miss Mcbride states 

that her  seizuires began 9in july '18, says she gets aura and jittery, pt has 

inbconsitent syncope- described as generalized tonic clonic last less than 3 min

but remains aware of her experience. Reports fatigue and headache for 5 min 

after a seizure. Pt says her sister told her she bites her tongue sometimes 

during seizures, reports only one episode of bladder incontince during a seizure

sometime last year.  She is on keppra and tegretol at [facility] eand has been 

compliant with her meds. 





ROS:   Pos+ hx of seizures, hx of CVA, 


   Neg- CP , SOB ,FC, NV, current seizures, numbness, tingling, weakness, recent

sickness





PMH: CVAx2, seizures, DM2, HTN, MAR, asthma, and vertigo


PSH: denies


Family History: Non-Contributory


Social History: Denies any tobacco, alcohol or illicit drug use


Allergies: Penicillin


Home Medications: As per MAR


Neurology exam: 


AAOX3. PERRL. Cn 2-12 normal. EOMI


Speech fluent. Can name and repeat. 


Motor: 5/5 


sensory: intact ft, pin, 


Cerebellar: no dysmetria


Gait normal. 











Review of Systems





- Constitutional


Constitutional: absent: As Per HPI, Anorexia, Chills, Daytime Sleepiness, 

Excessive Sweating, Fatigue, Fever, Frequent Falls, Headache, Increased 

Appetite, Lethargy, Malaise, Night Sweats, Snoring, Sleep Apnea, Weight Gain, 

Weight Loss, Weakness, Other





- EENT


Eyes: absent: As Per HPI, Blind Spots, Blurred Vision, Change in Vision, 

Decreased Night Vision, Diplopia, Discharge, Dry Eye, Exophthalmos, Floaters, 

Irritation, Itchy Eyes, Loss of Peripheral Vision, Pain, Photophobia, Requires 

Corrective Lenses, Sees Flashes, Spots in Vision, Tunnel Vision, Other Visual 

Disturbances, Loss of Vision, Other


Ears: absent: As Per HPI, Decreased Hearing, Ear Discharge, Ear Pain, Tinnitus, 

Abnormal Hearing, Disequilibrium, Dizziness, Other


Nose/Mouth/Throat: absent: As Per HPI, Epistaxis, Nasal Congestion, Nasal 

Discharge, Nasal Obstruction, Nasal Trauma, Nose Pain, Post Nasal Drip, Sinus 

Pain, Sinus Pressure, Bleeding Gums, Change in Voice, Dental Pain, Dry Mouth, 

Dysphagia, Halitosis, Hoarsness, Lip Swelling, Mouth Lesions, Mouth Pain, 

Odynophagia, Sore Throat, Throat Swelling, Tongue Swelling, Facial Pain, Neck 

Pain, Neck Mass, Other





- Cardiovascular


Cardiovascular: absent: As Per HPI, Acrocyanosis, Chest Pain, Chest Pain at 

Rest, Chest Pain with Activity, Claudication, Diaphoresis, Dyspnea, Dyspnea on 

Exertion, Edema, Irregular Heart Rhythm, Pain Radiating to Arm/Neck/Jaw, Leg 

Edema, Leg Ulcers, Lightheadedness, Orthopnea, Palpitations, Paroxysmal 

Nocturnal Dyspnea, Pedal Edema, Radiating Pain, Rapid Heart Rate, Slow Heart 

Rate, Syncope, Other





- Respiratory


Respiratory: absent: As Per HPI, Cough, Dyspnea, Hemoptysis, Dyspnea on 

Exertion, Wheezing, Snoring, Stridor, Pain on Inspiration, Chest Congestion, 

Excessive Mucous Production, Change in Mucous Color, Pain with Coughing, Other





- Neurological


Neurological: absent: As Per HPI, Abnormal Gait, Abnormal Hearing, Abnormal 

Movements, Abnormal Speech, Behavioral Changes, Burning Sensations, Confusion, 

Convulsions, Disequilibrium, Dizziness, Numbness, Focal Weakness, Frequent 

Falls, Headaches, Lack of Coordination, Loss of Vision, Memory Loss, 

Paresthesias, Radicular Pain, Restless Legs, Sensory Deficit, Syncope, Tingling,

Tremor, Vertigo, Weakness, Other Visual Disturbances, Other





Past Patient History





- Infectious Disease


Hx of Infectious Diseases: None





- Past Medical History & Family History


Past Medical History?: Yes





- Past Social History


Smoking Status: Never Smoked





- CARDIAC


Hx Hypercholesterolemia: Yes


Hx Hypertension: Yes





- PULMONARY


Hx Chronic Obstructive Pulmonary Disease (COPD): Yes (with residual RLE 

weakness)





- NEUROLOGICAL


Hx Dizziness: No





- HEENT


Hx HEENT Problems: No





- RENAL


Hx Chronic Kidney Disease: No





- ENDOCRINE/METABOLIC


Hx Diabetes Mellitus Type 2: Yes





- HEMATOLOGICAL/ONCOLOGICAL


Hx Blood Disorders: Yes





- INTEGUMENTARY


Hx Dermatological Problems: No





- MUSCULOSKELETAL/RHEUMATOLOGICAL


Hx Unsteady Gait: No





- GASTROINTESTINAL


Hx Gastrointestinal Disorders: Yes





- GENITOURINARY/GYNECOLOGICAL


Hx Genitourinary Disorders: No





- PSYCHIATRIC


Hx Psychophysiologic Disorder: No


Hx Substance Use: No





- SURGICAL HISTORY


Hx Surgeries: Yes





- ANESTHESIA


Hx Anesthesia: Yes


Hx Anesthesia Reactions: No


Hx Malignant Hyperthermia: No





Meds


Allergies/Adverse Reactions: 


                                    Allergies











Allergy/AdvReac Type Severity Reaction Status Date / Time


 


Penicillins Allergy Intermediate RASH Verified 01/02/18 07:01














- Medications


Medications: 


                               Current Medications





Albuterol/Ipratropium (Duoneb 3 Mg/0.5 Mg (3 Ml) Ud)  3 ml INH RQ6 PRN


   PRN Reason: Shortness of Breath


Amlodipine Besylate (Norvasc)  5 mg PO DAILY [facility]


Aspirin (Aspirin Chewable)  81 mg PO DAILY [facility]


Carvedilol (Coreg)  6.25 mg PO BID [facility]


Clopidogrel Bisulfate (Plavix)  75 mg PO DAILY [facility]


Cyclobenzaprine HCl (Flexeril)  5 mg PO DAILY PRN


   PRN Reason: Muscle spasm


Dextrose (Dextrose 50% Inj)  0 ml IV STAT PRN; Protocol


   PRN Reason: Hypoglycemia Protocol


Dextrose (Glutose 15)  0 gm PO ONCE PRN; Protocol


   PRN Reason: Hypoglycemia Protocol


Ferrous Sulfate (Feosol)  325 mg PO DAILY [facility]


Glucagon (Glucagen Diagnostic Kit)  0 mg IM STAT PRN; Protocol


   PRN Reason: Hypoglycemia Protocol


Dextrose (Dextrose 5% In Water 1000 Ml)  1,000 mls @ 0 mls/hr IV .Q0M PRN; 

Protocol


   PRN Reason: Hypoglycemia Protocol


Insulin Human Regular (Novolin R)  0 unit SC ACHS [facility]; Protocol


Levetiracetam (Keppra)  1,500 mg PO BID [facility]


Meclizine HCl (Antivert)  25 mg PO BID PRN


   PRN Reason: Nausea/Vomiting


Metformin HCl (Glucophage)  500 mg PO BIDCC [facility]


Oxcarbazepine (Trileptal)  300 mg PO BID [facility]


Ranolazine (Ranexa)  500 mg PO BID [facility]


Rosuvastatin Calcium (Crestor)  10 mg PO HS [facility]











Results





- Vital Signs


Recent Vital Signs: 


                                Last Vital Signs











Temp  98.0 F   04/08/19 12:00


 


Pulse  78   04/08/19 12:00


 


Resp  18   04/08/19 12:00


 


BP  131/80   04/08/19 12:00


 


Pulse Ox  100   04/08/19 12:00














- Labs


Labs: 


                         Laboratory Results - last 24 hr











  04/08/19





  12:55


 


POC Glucose (mg/dL)  93














Assessment & Plan





- Assessment and Plan (Free Text)


Assessment: 





  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 I have admitted her

for evaluation of these events and possibel increase in interictal discharges. 


Plan: 


1. VEEG 24 hours


2. continue all meds


3. Seizure precautions. 


Thank you 


Dr. Mckenzie 


Neurology

## 2019-04-09 VITALS — DIASTOLIC BLOOD PRESSURE: 92 MMHG | SYSTOLIC BLOOD PRESSURE: 126 MMHG

## 2019-04-09 VITALS — RESPIRATION RATE: 20 BRPM | OXYGEN SATURATION: 95 % | TEMPERATURE: 97.9 F

## 2019-04-09 VITALS — HEART RATE: 79 BPM

## 2019-04-09 LAB
ALBUMIN SERPL-MCNC: 3.3 G/DL (ref 3.5–5)
ALBUMIN/GLOB SERPL: 1.4 {RATIO} (ref 1–2.1)
ALT SERPL-CCNC: < 6 U/L (ref 9–52)
APTT BLD: 30 SECONDS (ref 21–34)
AST SERPL-CCNC: 31 U/L (ref 14–36)
BASOPHILS # BLD AUTO: 0.1 K/UL (ref 0–0.2)
BASOPHILS NFR BLD: 2 % (ref 0–2)
BUN SERPL-MCNC: 16 MG/DL (ref 7–17)
CALCIUM SERPL-MCNC: 8.5 MG/DL (ref 8.6–10.4)
EOSINOPHIL # BLD AUTO: 0.1 K/UL (ref 0–0.7)
EOSINOPHIL NFR BLD: 2 % (ref 0–4)
ERYTHROCYTE [DISTWIDTH] IN BLOOD BY AUTOMATED COUNT: 17.3 % (ref 11.5–14.5)
GFR NON-AFRICAN AMERICAN: > 60
HGB BLD-MCNC: 7.9 G/DL (ref 11–16)
INR PPP: 1.1
LYMPHOCYTES # BLD AUTO: 2 K/UL (ref 1–4.3)
LYMPHOCYTES NFR BLD AUTO: 33 % (ref 20–40)
MCH RBC QN AUTO: 21.3 PG (ref 27–31)
MCHC RBC AUTO-ENTMCNC: 30.5 G/DL (ref 33–37)
MCV RBC AUTO: 69.7 FL (ref 81–99)
MONOCYTES # BLD: 0.3 K/UL (ref 0–0.8)
MONOCYTES NFR BLD: 5 % (ref 0–10)
NEUTROPHILS # BLD: 3.5 K/UL (ref 1.8–7)
NEUTROPHILS NFR BLD AUTO: 58 % (ref 50–75)
NRBC BLD AUTO-RTO: 0 % (ref 0–2)
PLATELET # BLD: 335 K/UL (ref 130–400)
PMV BLD AUTO: 7.9 FL (ref 7.2–11.7)
PROTHROMBIN TIME: 11.5 SECONDS (ref 9.7–12.2)
RBC # BLD AUTO: 3.69 MIL/UL (ref 3.8–5.2)
WBC # BLD AUTO: 6 K/UL (ref 4.8–10.8)

## 2019-04-09 RX ADMIN — HUMAN INSULIN SCH: 100 INJECTION, SOLUTION SUBCUTANEOUS at 11:37

## 2019-04-09 RX ADMIN — HUMAN INSULIN SCH: 100 INJECTION, SOLUTION SUBCUTANEOUS at 07:41

## 2019-04-09 RX ADMIN — RANOLAZINE SCH MG: 500 TABLET, FILM COATED, EXTENDED RELEASE ORAL at 09:47

## 2019-04-09 NOTE — CP.PCM.DIS
Provider





- Provider


Date of Admission: 


04/08/19 10:54





Attending physician: 


Sandoval Byrnes Jr, MD





Consults: 








04/08/19 11:53


Neurology Consult Routine 


   Comment: 


   Consulting Provider: Antoinette Mckenzie


   Consulting Physician: Antoinette Mckenzie


   Reason for Consult: seizure disorder











Time Spent in preparation of Discharge (in minutes): 180





Diagnosis





- Discharge Diagnosis


(1) History of seizure


Status: Chronic   





(2) History of CVA (cerebrovascular accident)


Status: Chronic   





(3) Asthma


Status: Chronic   Priority: Medium   





(4) Diabetes mellitus


Status: Chronic   Priority: Medium   





(5) HTN (hypertension)


Status: Chronic   Priority: Medium   





Hospital Course





- Lab Results


Lab Results: 


                             Most Recent Lab Values











WBC  6.0 K/uL (4.8-10.8)   04/09/19  06:19    


 


RBC  3.69 Mil/uL (3.80-5.20)  L  04/09/19  06:19    


 


Hgb  7.9 g/dL (11.0-16.0)  L  04/09/19  06:19    


 


Hct  25.7 % (34.0-47.0)  L  04/09/19  06:19    


 


MCV  69.7 fL (81.0-99.0)  L  04/09/19  06:19    


 


MCH  21.3 pg (27.0-31.0)  L  04/09/19  06:19    


 


MCHC  30.5 g/dL (33.0-37.0)  L  04/09/19  06:19    


 


RDW  17.3 % (11.5-14.5)  H  04/09/19  06:19    


 


Plt Count  335 K/uL (130-400)   04/09/19  06:19    


 


MPV  7.9 fL (7.2-11.7)   04/09/19  06:19    


 


Neut % (Auto)  58.0 % (50.0-75.0)   04/09/19  06:19    


 


Lymph % (Auto)  33.0 % (20.0-40.0)   04/09/19  06:19    


 


Mono % (Auto)  5.0 % (0.0-10.0)   04/09/19  06:19    


 


Eos % (Auto)  2.0 % (0.0-4.0)   04/09/19  06:19    


 


Baso % (Auto)  2.0 % (0.0-2.0)   04/09/19  06:19    


 


Neut # (Auto)  3.5 K/uL (1.8-7.0)   04/09/19  06:19    


 


Lymph # (Auto)  2.0 K/uL (1.0-4.3)   04/09/19  06:19    


 


Mono # (Auto)  0.3 K/uL (0.0-0.8)   04/09/19  06:19    


 


Eos # (Auto)  0.1 K/uL (0.0-0.7)   04/09/19  06:19    


 


Baso # (Auto)  0.1 K/uL (0.0-0.2)   04/09/19  06:19    


 


PT  11.5 SECONDS (9.7-12.2)   04/09/19  06:19    


 


INR  1.1   04/09/19  06:19    


 


APTT  30 SECONDS (21-34)   04/09/19  06:19    


 


Sodium  138 mmol/L (132-148)   04/09/19  06:19    


 


Potassium  4.7 mmol/L (3.6-5.2)   04/09/19  06:19    


 


Chloride  106 mmol/L ()   04/09/19  06:19    


 


Carbon Dioxide  26 mmol/L (22-30)   04/09/19  06:19    


 


Anion Gap  10  (10-20)   04/09/19  06:19    


 


BUN  16 mg/dL (7-17)   04/09/19  06:19    


 


Creatinine  0.7 mg/dL (0.7-1.2)   04/09/19  06:19    


 


Est GFR ( Amer)  > 60   04/09/19  06:19    


 


Est GFR (Non-Af Amer)  > 60   04/09/19  06:19    


 


POC Glucose (mg/dL)  100 mg/dL ()   04/09/19  11:20    


 


Random Glucose  82 mg/dL ()   04/09/19  06:19    


 


Hemoglobin A1c  5.8 % (4.2-6.5)   04/08/19  16:59    


 


Calcium  8.5 mg/dl (8.6-10.4)  L  04/09/19  06:19    


 


Phosphorus  4.0 mg/dL (2.5-4.5)   04/09/19  06:19    


 


Magnesium  2.0 mg/dL (1.6-2.3)   04/09/19  06:19    


 


Total Bilirubin  0.4 mg/dL (0.2-1.3)   04/09/19  06:19    


 


AST  31 U/L (14-36)   04/09/19  06:19    


 


ALT  < 6 U/L (9-52)  L  04/09/19  06:19    


 


Alkaline Phosphatase  53 U/L ()   04/09/19  06:19    


 


Total Protein  5.6 g/dL (6.3-8.3)  L  04/09/19  06:19    


 


Albumin  3.3 g/dL (3.5-5.0)  L  04/09/19  06:19    


 


Globulin  2.4 gm/dL (2.2-3.9)   04/09/19  06:19    


 


Albumin/Globulin Ratio  1.4  (1.0-2.1)   04/09/19  06:19    


 


Triglycerides  69 mg/dL (0-149)   04/08/19  16:59    


 


Cholesterol  108 mg/dL (0-199)   04/08/19  16:59    


 


LDL Cholesterol Direct  48 mg/dL (0-129)   04/08/19  16:59    


 


HDL Cholesterol  53 mg/dL (30-70)   04/08/19  16:59    














- Hospital Course


Hospital Course: 





On admission: 





53F pmhx of CVAx2 last one in 8/2018 (received tPA) with residual RLE weakness, 

seizures, DM2, HTN, MAR, asthma, and vertigo admitted to medicine service for a 

48hr EEG.  Pt sent in from Neurologist office Dr Mckenzie. Reports her seizuires 

began 9in july '18, says she gets aura and jittery, pt has inbconsitent syncope-

described as generalized tonic clonic last less than 3 min but remains aware of 

her experience. Reports fatigue and headache for 5 min after a seizure. Pt says 

her sister told her she bites her tongue sometimes during seizures, reports only

one episode of bladder incontince during a seizure sometime last year. Pt has 

been following up in clinic with Dr Mckenzie, and today was told to come in for a 

48hr video.





On hospitalization





Patient admitted for video monitor EGD. Neuro Dr Mckenzie on board. As per neuro, EGD

shows normal results. Patient placed on Keppra and trilepta. History of CVA, 

HTN, asthma and DM managed with home medications. 





On discharged 


THe following instruction were given to patient. 


Patient stable to discharge as per Dr Mckenzie and Dr Byrnes


Patient to continue home medications as indicated


Patient to follow up with primary medical doctor for medication refill and 

management 


Patient is to follow up with Dr Mckenzie in one month for management of epilepsy














- Date & Time of H&P


Date of H&P: 04/08/19


Time of H&P: 12:02





Discharge Exam





- Head Exam


Head Exam: ATRAUMATIC, NORMAL INSPECTION





- Eye Exam


Eye Exam: EOMI, Normal appearance





- Respiratory Exam


Respiratory Exam: Clear to PA & Lateral, NORMAL BREATHING PATTERN, UNREMARKABLE





- Cardiovascular Exam


Cardiovascular Exam: REGULAR RHYTHM, +S1, +S2





- GI/Abdominal Exam


GI & Abdominal Exam: Normal Bowel Sounds, Unremarkable





- Extremities Exam


Extremities exam: full ROM





- Back Exam


Back exam: NORMAL INSPECTION





- Neurological Exam


Neurological exam: Alert, CN II-XII Intact, Normal Gait, Oriented x3





- Psychiatric Exam


Psychiatric exam: Normal Affect, Normal Mood





- Skin


Skin Exam: Dry, Intact, Normal Color, Warm





Discharge Plan





- Follow Up Plan


Condition: GOOD


Disposition: HOME/ ROUTINE


Instructions:  Seizures, Adult (DC)


Additional Instructions: 


Patient stable to discharge as per Dr Mckenzie and Dr Byrnes


Patient to continue home medications as indicated


Patient to follow up with primary medical doctor for medication refill and 

management 


Patient is to follow up with Dr Mckenzie in one month for management of epilepsy








Referrals: 


Sandoval Byrnes Jr., MD [Medical Doctor] - 


Antoinette Mckenzie MD [Staff Provider] -

## 2023-03-08 NOTE — CP.PCM.PN
<Gilles Gilmore - Last Filed: 08/11/18 09:07>





Subjective





- Date & Time of Evaluation


Date of Evaluation: 08/11/18


Time of Evaluation: 07:33





- Subjective


Subjective: 


Gilles Gilmore DO PGY1 Internal Medicine Intern - Hospital Progress Note 


Patient seen at bedside in ICU this AM; no acute events reported overnight; 

nonepilipetic seizure day prior. 


Today pt appears to be having new progressive dysarthria compared to day before

; nursing reported that patient tolerated her food well and did not have any 

aspiration type symptoms. 


12 system ROS otherwise negative 





Objective





- Vital Signs/Intake and Output


Vital Signs (last 24 hours): 


 











Temp Pulse Resp BP Pulse Ox


 


 98.1 F   58 L  28 H  126/80   99 


 


 08/10/18 16:00  08/10/18 18:00  08/10/18 17:00  08/10/18 17:32  08/10/18 17:00











- Medications


Medications: 


 Current Medications





Acetaminophen (Tylenol 325mg Tab)  650 mg PO Q4H PRN


   PRN Reason: headache and pain


   Last Admin: 08/11/18 05:58 Dose:  650 mg


Albuterol/Ipratropium (Duoneb 3 Mg/0.5 Mg (3 Ml) Ud)  3 ml IH Q2H PRN


   PRN Reason: Shortness of Breath


Amlodipine Besylate (Norvasc)  5 mg PO DAILY [facility]


   Last Admin: 08/10/18 09:50 Dose:  5 mg


Aspirin (Aspirin Chewable)  81 mg PO DAILY [facility]


   Last Admin: 08/10/18 14:53 Dose:  81 mg


Atorvastatin Calcium (Lipitor)  40 mg PO DAILY [facility]


   Last Admin: 08/10/18 09:51 Dose:  40 mg


Carvedilol (Coreg)  6.25 mg PO BID [facility]


   Last Admin: 08/10/18 17:32 Dose:  6.25 mg


Cholecalciferol (Vitamin D)  1,000 intlu PO DAILY [facility]


   Last Admin: 08/10/18 09:50 Dose:  1,000 intlu


Clopidogrel Bisulfate (Plavix)  75 mg PO DAILY [facility]


   Last Admin: 08/10/18 14:53 Dose:  75 mg


Hydrochlorothiazide (Hydrodiuril)  25 mg PO DAILY [facility]


   Last Admin: 08/10/18 09:51 Dose:  25 mg


Insulin Human Regular (Humulin R Low)  0 units SC ACHS [facility]


   PRN Reason: Protocol


   Last Admin: 08/10/18 14:08 Dose:  Not Given


Levetiracetam (Keppra)  500 mg PO BID [facility]


   Last Admin: 08/10/18 17:32 Dose:  500 mg


Losartan Potassium (Cozaar)  100 mg PO DAILY [facility]


   Last Admin: 08/10/18 14:50 Dose:  100 mg


Meclizine HCl (Antivert)  25 mg PO TID [facility]


   Last Admin: 08/10/18 17:32 Dose:  25 mg


Pantoprazole Sodium (Protonix Ec Tab)  40 mg PO 0600 [facility]


   Last Admin: 08/11/18 06:01 Dose:  40 mg


Vitamin A (Vitamin A & D Oint Ud Foilpak)  1 ea TOP Q2 PRN


   PRN Reason: Dry mouth











- Labs


Labs: 


 





 08/11/18 05:00 





 08/11/18 05:00 





 











PT  11.4 SECONDS (9.4-12.5)   08/08/18  14:30    


 


INR  1.00   08/08/18  14:30    


 


APTT  32.5 Seconds (25.1-36.5)   08/08/18  14:30    








Physical Exam





- Constitutional


Appears: Non-toxic, No Acute Distress





- Head Exam


Head Exam: ATRAUMATIC, NORMOCEPHALIC


Additional comments: 


Right sided facial droop





- Eye Exam


Eye Exam: EOMI, Normal appearance, PERRL.  absent: Conjunctival injection, 

Nystagmus, Periorbital swelling, Periorbital tenderness, Scleral icterus


Pupil Exam: NORMAL ACCOMODATION, PERRL.  absent: Fixed, Irregular, Miosis, 

Mydriatic, Unequal





- ENT Exam


ENT Exam: Mucous Membranes Moist, Normal External Ear Exam.  absent: Mucous 

Membranes Dry, Normal Oropharynx





- Neck Exam


Neck exam: Positive for: Full Rom, Normal Inspection.  Negative for: 

Lymphadenopathy, Meningismus, Tenderness, Thyromegaly





- Respiratory Exam


Respiratory Exam: Clear to Auscultation Bilateral, NORMAL BREATHING PATTERN.  

absent: Accessory Muscle Use, Chest Wall Tenderness, Decreased Breath Sounds, 

Prolonged Expiratory Phase, Rales, Rhonchi, Wheezes, Respiratory Distress, 

Stridor





- Cardiovascular Exam


Cardiovascular Exam: REGULAR RHYTHM, RRR, +S1, +S2.  absent: Bradycardia, 

Tachycardia, Clicks, Diastolic murmur, Gallop, Irregular Rhythm, JVD, Rubs, +S4

, Systolic Murmur





- GI/Abdominal Exam


GI & Abdominal Exam: Normal Bowel Sounds, Soft.  absent: Bruit, Diminished 

Bowel Sounds, Distended, Firm, Guarding, Hernia, Hyperactive Bowel Sounds, 

Hypoactive Bowel Sounds, Mass, Organomegaly, Pulsatile Mass, Rebound, Rigid, 

Tenderness





- Extremities Exam


Extremities exam: Decreased ROM in RLE, but could bring leg past midline. 

Difficulty lifting. Positive for: normal capillary refill, pedal pulses 

present.  Negative for: calf tenderness, joint swelling, normal inspection, 

pedal edema, tenderness





- Back Exam


Back exam: FULL ROM, NORMAL INSPECTION.  absent: CVA tenderness (L), CVA 

tenderness (R), muscle spasm, paraspinal tenderness, rash noted, tenderness, 

vertebral tenderness





- Neurological Exam


Neurological exam: Alert, Oriented x3





- Expanded Neurological Exam


  ** Expanded


Patient oriented to: person, place, time, situation


Speech: Dysarthria present today; worsened from yesterday


Cranial nerves: Facial Palsy w/Forehead Movement: Abnormal Right


Neuro motor strength exam: Left Upper Extremity: 4, Right Upper Extremity: 4, 

Left Lower Extremity: 4, Right Lower Extremity: 2


Sensation intact in UE and LE bilaterally





- Psychiatric Exam


Psychiatric exam: Normal Affect, Normal Mood





- Skin


Skin Exam: Dry, Intact, Normal Color, Warm





Assessment and Plan





- Assessment and Plan (Free Text)


Assessment: 





52 year old female with a past medical history significant for previous CVA 

with residual RLE weakness, seizures, DM2, HTN, TRISTIN, asthma, and vertigo who 

presented with right sided weakness, dysarthria and facial drooping that 

started 8/8 during an EEG.





Plan: 


1. Dysarthria vs TIA


- Patient with new onset dysarthria this AM; finding is abnormal compared to 

exam yesterday; 


- Will repeat CT head to r/o stroke progression/ hemorrhagic conversion 


- Initial CT Head showed no intracranial abnormalities


- CXR showed no active disease


- Given tPA in ED 8/8


- MRA Neck normal


- Brain MRI showed no acute abnormality. Mild chronic microangiopathic changes 

along with mild cerebellar tonsillar ectopia


- Repeat Head CT showed no evidence of acute infarct


- Speech therapy: mild oral pharyngeal dysphagia w low to mod risk aspiration 

due to min oral motor weakness


- Passed Swallow Study and recommended mechanical soft bite size pieces


- continued home PO meds along with dual anti-platelet therapy 


- NS @ 100 cc/hr


- Fall, bleed and Aspiration precautions


- Transfer to telemetry


- Neurology and Cardiology consulted, all recommendations appreciated


- f/u Echo w bubble study to rule out PFO due to recurrent CVAs per cardiology (

Dr. Eller)





2. History of Seizures


- Keppra 500mg PO BID


- Nonepileptic seizure observed yesterday; will continue monitoring 


- Seizure precautions


- will continue to monitor





3. History of DM2


- ISS-Low and Accuchecks Q6


- A1c 5.8


- lipid panel wnl





4. History of Asthma


-Duonebs Q2 PRN


- will monitor





5. Hx of HTN


- carvedilol 6.25 BID, Norvasc 5


- will monitor





GI Prophylaxis: Protonix 40 PO


DVT Prophylaxis: SCD's 


Disposition: F/u PT recommendations regarding rehab





Patient seen, case reviewed, and plan discussed with Dr. Cummings.





Gilles Gilmore DO - PGY1 Internal Medicine Intern - Pager [phone]





<Shannon Cummings - Last Filed: 08/11/18 13:18>





Objective





- Vital Signs/Intake and Output


Vital Signs (last 24 hours): 


 











Temp Pulse Resp BP Pulse Ox


 


 98.3 F   58 L  21   126/72   96 


 


 08/10/18 20:00  08/11/18 10:15  08/11/18 08:29  08/11/18 10:15  08/11/18 08:29








Intake and Output: 


 











 08/11/18 08/11/18





 06:59 18:59


 


Output Total 1200 


 


Balance -1200 














- Medications


Medications: 


 Current Medications





Acetaminophen (Tylenol 325mg Tab)  650 mg PO Q4H PRN


   PRN Reason: headache and pain


   Last Admin: 08/11/18 05:58 Dose:  650 mg


Albuterol/Ipratropium (Duoneb 3 Mg/0.5 Mg (3 Ml) Ud)  3 ml IH Q2H PRN


   PRN Reason: Shortness of Breath


Amlodipine Besylate (Norvasc)  5 mg PO DAILY [facility]


   Last Admin: 08/11/18 10:13 Dose:  5 mg


Aspirin (Aspirin Chewable)  81 mg PO DAILY [facility]


   Last Admin: 08/11/18 10:12 Dose:  81 mg


Atorvastatin Calcium (Lipitor)  40 mg PO DAILY [facility]


   Last Admin: 08/11/18 10:12 Dose:  40 mg


Carvedilol (Coreg)  6.25 mg PO BID [facility]


   Last Admin: 08/11/18 10:15 Dose:  6.25 mg


Cholecalciferol (Vitamin D)  1,000 intlu PO DAILY [facility]


   Last Admin: 08/11/18 10:13 Dose:  1,000 intlu


Clopidogrel Bisulfate (Plavix)  75 mg PO DAILY [facility]


   Last Admin: 08/11/18 10:13 Dose:  75 mg


Hydrochlorothiazide (Hydrodiuril)  25 mg PO DAILY [facility]


   Last Admin: 08/11/18 10:12 Dose:  25 mg


Insulin Human Regular (Humulin R Low)  0 units SC ACHS [facility]


   PRN Reason: Protocol


   Last Admin: 08/10/18 14:08 Dose:  Not Given


Levetiracetam (Keppra)  500 mg PO BID [facility]


   Last Admin: 08/11/18 10:12 Dose:  500 mg


Losartan Potassium (Cozaar)  100 mg PO DAILY [facility]


   Last Admin: 08/11/18 11:01 Dose:  100 mg


Meclizine HCl (Antivert)  25 mg PO TID [facility]


   Last Admin: 08/11/18 10:15 Dose:  25 mg


Pantoprazole Sodium (Protonix Ec Tab)  40 mg PO 0600 [facility]


   Last Admin: 08/11/18 06:01 Dose:  40 mg


Vitamin A (Vitamin A & D Oint Ud Foilpak)  1 ea TOP Q2 PRN


   PRN Reason: Dry mouth











- Labs


Labs: 


 





 08/11/18 05:00 





 08/11/18 05:00 





 











PT  11.4 SECONDS (9.4-12.5)   08/08/18  14:30    


 


INR  1.00   08/08/18  14:30    


 


APTT  32.5 Seconds (25.1-36.5)   08/08/18  14:30    














Attending/Attestation





- Attestation


I have personally seen and examined this patient.: Yes


I have fully participated in the care of the patient.: Yes


I have reviewed all pertinent clinical information, including history, physical 

exam and plan: Yes


Notes (Text): 





08/11/18 13:16


Medical record note made by the resident after discussion with my direction and 

input after the patient was personally seen and examined by me. I have reviewed 

the chart and agree that the record accurately reflects by personal performance 

of the history, physical exam, data review, and medical decision-making, in the 

course for the patient. I have also personally directed the plan of care.





Patient was found to have dysarthia again today , which as per patient was 

started when she was having EEG.There is no other focal deficit.CT scan of had 

today is unchanged.





Blood pressure is stable.Echo was reviewed.There is no PFO or cardiac 

thrombus.Patient has been restarted on her Aspirin and Plavix.





Management plan was discussed in detail with patient. Education was provided. normal/well-groomed/no distress